# Patient Record
Sex: FEMALE | Race: BLACK OR AFRICAN AMERICAN | Employment: UNEMPLOYED | URBAN - METROPOLITAN AREA
[De-identification: names, ages, dates, MRNs, and addresses within clinical notes are randomized per-mention and may not be internally consistent; named-entity substitution may affect disease eponyms.]

---

## 2018-09-10 ENCOUNTER — APPOINTMENT (OUTPATIENT)
Dept: GENERAL RADIOLOGY | Age: 58
End: 2018-09-10
Payer: COMMERCIAL

## 2018-09-10 ENCOUNTER — HOSPITAL ENCOUNTER (EMERGENCY)
Age: 58
Discharge: HOME | End: 2018-09-10
Attending: EMERGENCY MEDICINE
Payer: COMMERCIAL

## 2018-09-10 VITALS
SYSTOLIC BLOOD PRESSURE: 171 MMHG | WEIGHT: 190 LBS | TEMPERATURE: 98.1 F | OXYGEN SATURATION: 100 % | HEART RATE: 79 BPM | HEIGHT: 63 IN | BODY MASS INDEX: 33.66 KG/M2 | RESPIRATION RATE: 16 BRPM | DIASTOLIC BLOOD PRESSURE: 96 MMHG

## 2018-09-10 DIAGNOSIS — G89.29 ACUTE EXACERBATION OF CHRONIC LOW BACK PAIN: Primary | ICD-10-CM

## 2018-09-10 DIAGNOSIS — S93.601A FOOT SPRAIN, RIGHT, INITIAL ENCOUNTER: ICD-10-CM

## 2018-09-10 DIAGNOSIS — R03.0 ELEVATED BLOOD PRESSURE READING: ICD-10-CM

## 2018-09-10 DIAGNOSIS — S80.01XA CONTUSION OF RIGHT KNEE, INITIAL ENCOUNTER: ICD-10-CM

## 2018-09-10 DIAGNOSIS — M54.50 ACUTE EXACERBATION OF CHRONIC LOW BACK PAIN: Primary | ICD-10-CM

## 2018-09-10 PROCEDURE — 73630 X-RAY EXAM OF FOOT: CPT

## 2018-09-10 PROCEDURE — 99284 EMERGENCY DEPT VISIT MOD MDM: CPT

## 2018-09-10 RX ORDER — DOCUSATE SODIUM 100 MG/1
100 CAPSULE, LIQUID FILLED ORAL 2 TIMES DAILY
COMMUNITY

## 2018-09-10 RX ORDER — METHOCARBAMOL 750 MG/1
750 TABLET, FILM COATED ORAL 3 TIMES DAILY PRN
Qty: 30 TABLET | Refills: 0 | Status: SHIPPED | OUTPATIENT
Start: 2018-09-10 | End: 2018-09-20

## 2018-09-10 RX ORDER — TRAMADOL HYDROCHLORIDE 50 MG/1
50 TABLET ORAL EVERY 6 HOURS PRN
Qty: 12 TABLET | Refills: 0 | Status: SHIPPED | OUTPATIENT
Start: 2018-09-10 | End: 2018-09-13

## 2018-09-10 RX ORDER — AMLODIPINE BESYLATE 10 MG/1
10 TABLET ORAL DAILY
COMMUNITY
End: 2020-09-14 | Stop reason: SDUPTHER

## 2018-09-10 ASSESSMENT — PAIN SCALES - GENERAL: PAINLEVEL_OUTOF10: 10

## 2018-09-10 ASSESSMENT — PAIN DESCRIPTION - INTENSITY: RATING_2: 8

## 2018-09-10 ASSESSMENT — PAIN DESCRIPTION - LOCATION
LOCATION: BACK
LOCATION_2: ANKLE

## 2018-09-10 ASSESSMENT — PAIN DESCRIPTION - PAIN TYPE: TYPE: ACUTE PAIN

## 2018-09-10 ASSESSMENT — PAIN DESCRIPTION - ORIENTATION: ORIENTATION_2: RIGHT

## 2018-09-10 NOTE — ED NOTES
Reviewed d/c instructions with pt. Patient discharged home with scripts  X2 .      Kriss Mccord RN  09/10/18 2509

## 2018-09-10 NOTE — ED PROVIDER NOTES
TRIAGE CHIEF COMPLAINT:   Chief Complaint   Patient presents with    Fall         HPI: Joel Dowell is a 62 y.o. female who presents to the Emergency Department with complaint of Mid back pain, right knee pain and right foot pain. The patient fell last night landing on her hands and knees. Denies loss of consciousness. She thinks she may have hit her head but denies headache. She has no neck pain. Denies numbness or weakness. She has no chest pain or shortness of breath. Denies abdominal pain. She states Tylenol has not been helping. OARRS reviewed. Narcotics score was 110. REVIEW OF SYSTEMS:  6 systems reviewed. Pertinent positives per HPI. Otherwise noted to be negative. Nursing notes reviewed and agree with above. Past medical/surgical history reviewed. ALLERGIES   Allergies   Allergen Reactions    Aspirin      Pt does not know     Pcn [Penicillins]          BP (!) 171/96   Pulse 79   Temp 98.1 °F (36.7 °C) (Oral)   Resp 16   Ht 5' 3\" (1.6 m)   Wt 86.2 kg (190 lb)   SpO2 100%   BMI 33.66 kg/m²   General:  No acute distress. Non toxic appearance  Head:   Normocephalic and atraumatic  Eyes:   Conjunctiva clear, EOM's intact. Sclera anicteric. ENT:   Mucous membranes moist  Neck:   Supple. No adenopathy or jugular venous distension  Lungs/Chest:  No respiratory distress  CVS:   Regular rate and rhythm  Abdomen: Bowel sounds normal.  Abdomen soft and nontender. Extremities:  Mild inconsistent tenderness of the right anterior knee. No gross laxity or effusion. Range of motion is normal.  No hip or ankle tenderness. There is some mild tenderness of the lateral aspect of the right foot. No point tenderness. No deformity noted. Distal neurovascular exam is intact. Skin:   No rashes or lesions to exposed skin  Back:   Patient has some tenderness in the right mid and right upper back. She states the upper back tenderness is chronic.   Mild tenderness in the right paralumbar

## 2019-06-05 ENCOUNTER — HOSPITAL ENCOUNTER (EMERGENCY)
Age: 59
Discharge: HOME | End: 2019-06-05
Attending: EMERGENCY MEDICINE
Payer: COMMERCIAL

## 2019-06-05 ENCOUNTER — APPOINTMENT (OUTPATIENT)
Dept: GENERAL RADIOLOGY | Age: 59
End: 2019-06-05
Payer: COMMERCIAL

## 2019-06-05 VITALS
RESPIRATION RATE: 17 BRPM | SYSTOLIC BLOOD PRESSURE: 159 MMHG | WEIGHT: 181.8 LBS | OXYGEN SATURATION: 98 % | HEART RATE: 85 BPM | HEIGHT: 63 IN | DIASTOLIC BLOOD PRESSURE: 95 MMHG | TEMPERATURE: 98.4 F | BODY MASS INDEX: 32.21 KG/M2
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
## 2019-06-05 DIAGNOSIS — S80.01XA CONTUSION OF RIGHT KNEE, INITIAL ENCOUNTER: ICD-10-CM

## 2019-06-05 DIAGNOSIS — S20.211A CONTUSION OF RIGHT CHEST WALL, INITIAL ENCOUNTER: Primary | ICD-10-CM

## 2019-06-05 DIAGNOSIS — R03.0 ELEVATED BLOOD PRESSURE READING: ICD-10-CM

## 2019-06-05 LAB
EKG ATRIAL RATE: 70 BPM
EKG DIAGNOSIS: NORMAL
EKG P AXIS: 67 DEGREES
EKG P-R INTERVAL: 170 MS
EKG Q-T INTERVAL: 400 MS
EKG QRS DURATION: 80 MS
EKG QTC CALCULATION (BAZETT): 432 MS
EKG R AXIS: 24 DEGREES
EKG T AXIS: 42 DEGREES
EKG VENTRICULAR RATE: 70 BPM

## 2019-06-05 PROCEDURE — 93010 ELECTROCARDIOGRAM REPORT: CPT | Performed by: INTERNAL MEDICINE

## 2019-06-05 PROCEDURE — 99283 EMERGENCY DEPT VISIT LOW MDM: CPT

## 2019-06-05 PROCEDURE — 71101 X-RAY EXAM UNILAT RIBS/CHEST: CPT

## 2019-06-05 PROCEDURE — 73562 X-RAY EXAM OF KNEE 3: CPT

## 2019-06-05 PROCEDURE — 93005 ELECTROCARDIOGRAM TRACING: CPT | Performed by: EMERGENCY MEDICINE

## 2019-06-05 RX ORDER — METHOCARBAMOL 750 MG/1
750 TABLET, FILM COATED ORAL 3 TIMES DAILY PRN
Qty: 30 TABLET | Refills: 0 | Status: SHIPPED | OUTPATIENT
Start: 2019-06-05 | End: 2019-06-15

## 2019-06-05 RX ORDER — TRAMADOL HYDROCHLORIDE 50 MG/1
50 TABLET ORAL EVERY 6 HOURS PRN
Qty: 12 TABLET | Refills: 0 | Status: SHIPPED | OUTPATIENT
Start: 2019-06-05 | End: 2019-06-08

## 2019-06-05 ASSESSMENT — PAIN DESCRIPTION - DESCRIPTORS: DESCRIPTORS: THROBBING

## 2019-06-05 ASSESSMENT — PAIN DESCRIPTION - LOCATION: LOCATION: OTHER (COMMENT)

## 2019-06-05 ASSESSMENT — PAIN SCALES - GENERAL: PAINLEVEL_OUTOF10: 10

## 2019-06-05 ASSESSMENT — PAIN DESCRIPTION - FREQUENCY: FREQUENCY: CONTINUOUS

## 2019-06-05 ASSESSMENT — PAIN DESCRIPTION - PAIN TYPE: TYPE: ACUTE PAIN

## 2019-06-05 NOTE — ED PROVIDER NOTES
TRIAGE CHIEF COMPLAINT:   Chief Complaint   Patient presents with    Fall         HPI: Karmen Alerge is a 62 y.o. female who presents to the Emergency Department with complaint of Right mid chest and right mid back pain as well as right knee pain. The patient fell yesterday. No loss of consciousness. She thinks she landed on her right knee and right arm. She does not remember hitting her chest.  Denies head injury or headache. She has no neck pain. The chest pain worsens with truncal movement and breathing. It worsens with coughing but she does not feel she has a chest cold. Her pain does not worsen with normal ambulation. No nausea or vomiting. Denies shortness of breath. She has no diaphoresis. She does have a history of chronic lower back pain. OARRS rreviewed. REVIEW OF SYSTEMS:  6 systems reviewed. Pertinent positives per HPI. Otherwise noted to be negative. Nursing notes reviewed and agree with above. Past medical/surgical history reviewed.     MEDICATIONS   Patient's Medications   New Prescriptions    No medications on file   Previous Medications    ACETAMINOPHEN (TYLENOL PO)    Take by mouth    AMLODIPINE (NORVASC) 10 MG TABLET    Take 10 mg by mouth daily    DOCUSATE SODIUM (COLACE) 100 MG CAPSULE    Take 100 mg by mouth 2 times daily    HYDROCHLOROTHIAZIDE (HYDRODIURIL) 25 MG TABLET    Take 1 tablet by mouth daily    IBUPROFEN (IBU) 600 MG TABLET    Take 1 tablet by mouth every 8 hours as needed for Pain    MULTIPLE VITAMINS-MINERALS (THERAPEUTIC MULTIVITAMIN-MINERALS) TABLET    Take 1 tablet by mouth daily   Modified Medications    No medications on file   Discontinued Medications    No medications on file         ALLERGIES   Allergies   Allergen Reactions    Aspirin      Pt does not know     Pcn [Penicillins]          BP (!) 159/95   Pulse 85   Temp 98.4 °F (36.9 °C) (Oral)   Resp 17   Ht 5' 3\" (1.6 m)   Wt 82.5 kg (181 lb 12.8 oz)   SpO2 98%   BMI 32.20 kg/m² General:  No acute distress. Non toxic appearance  Head:   Normocephalic and atraumatic  Eyes:   Conjunctiva clear, MCKAYLA, EOM's intact. Sclera anicteric. ENT:   Mucous membranes moist  Neck:   Supple. No adenopathy or jugular venous distension. Neck is nontender. Lungs/Chest:  No respiratory distress. Lungs are clear bilaterally. There is some tenderness in the right parasternal area which seems to mimic the patient's pain. CVS:   Regular rate and rhythm  Abdomen:  nnontender  Extremities:  Mild tenderness of the right knee. No deformity or definite point tenderness. No obvious effusion or laxity. Range of motion is mildly limited due to pain. Right arm is nontender and has normal range of motion. Skin:   No rashes or lesions to exposed skin  Back:   There is some right posterolateral rib tenderness. No visible bruising or crepitance. No left-sided tenderness. No CVA tenderness. Neuro:  Alert and OX3. Speech clear and appropriate. No focal weakness. Normal sensation in all extremities. Gait normal.  Psych:   Affect normal. Mood normal        RADIOLOGY:  XR RIBS RIGHT INCLUDE CHEST (MIN 3 VIEWS)   Final Result      No rib fracture      XR KNEE RIGHT (3 VIEWS)   Final Result      No acute bony pathology. Mild degenerative changes          EKG  Sinus rhythm at a rate of 70. Axis is 24. There is no ischemia. No ectopy noted. QTC is 432. ED COURSE / MDM:  40-year-old female with history of chronic lower back pain fell yesterday. No loss of consciousness, head injury or headache. Today she complains of some right mid chest pain and right mid back pain. She also has some pain in the right knee. Lung sounds are clear. She is neurologically intact. No neck pain or tenderness. Head is normocephalic and atraumatic. EKG shows no ischemia or arrhythmia. X-rays of the right ribs/chest and right knee read by the radiologist show no fracture  Or acute bony abnormality.   I recommended the patient use ice and rest the area. Clinically this is a contusion. She states the pain was minimal yesterday. She was given a few tramadol tablets and Robaxin to take for pain or muscle tightness. I discussed with Luis Fernando Batista the results of evaluation in the Emergency Department, diagnosis, care and prognosis. The plan is to discharge to home. The patient is in agreement with the plan and questions have been answered. I also discussed with the patient and/or family the reasons which may require a return visit and the importance of follow-up care.        (Please note that portions of this note may have been completed with a voice recognition program.  Efforts were made to edit the dictation but occasionally words are mis-transcribed)        FINAL IMPRESSION:  1 -- chest wall contusion  2 -- right knee contusion  3 -- fall    4 -- elevated blood pressure reading                Jaci Carlson MD  06/05/19 1608 [facility] Liane Gates MD  06/05/19 5960

## 2019-06-05 NOTE — ED NOTES
Patient prepared for and ready to be discharged. Patient discharged at this time in no acute distress after verbalizing understanding of discharge instructions. Patient left after receiving After Visit Summary instructions.         Uvaldo Walton RN  06/05/19 1125

## 2019-09-02 ENCOUNTER — HOSPITAL ENCOUNTER (EMERGENCY)
Age: 59
Discharge: HOME | End: 2019-09-02
Attending: EMERGENCY MEDICINE
Payer: COMMERCIAL

## 2019-09-02 VITALS
DIASTOLIC BLOOD PRESSURE: 88 MMHG | OXYGEN SATURATION: 100 % | WEIGHT: 177.6 LBS | RESPIRATION RATE: 16 BRPM | HEART RATE: 87 BPM | TEMPERATURE: 98.3 F | SYSTOLIC BLOOD PRESSURE: 150 MMHG | BODY MASS INDEX: 31.46 KG/M2

## 2019-09-02 DIAGNOSIS — R05.9 COUGH: ICD-10-CM

## 2019-09-02 DIAGNOSIS — R09.82 POST-NASAL DRIP: Primary | ICD-10-CM

## 2019-09-02 PROCEDURE — 99282 EMERGENCY DEPT VISIT SF MDM: CPT

## 2019-09-02 RX ORDER — CETIRIZINE HYDROCHLORIDE 10 MG/1
10 TABLET ORAL DAILY
Qty: 20 TABLET | Refills: 0 | Status: ON HOLD | OUTPATIENT
Start: 2019-09-02 | End: 2021-01-28 | Stop reason: SINTOL

## 2019-09-02 ASSESSMENT — PAIN SCALES - GENERAL: PAINLEVEL_OUTOF10: 9

## 2019-09-02 ASSESSMENT — PAIN DESCRIPTION - LOCATION: LOCATION: THROAT

## 2019-09-07 NOTE — ED PROVIDER NOTES
used smokeless tobacco. She reports that she has current or past drug history. Drug: Marijuana. She reports that she does not drink alcohol. Family history:  History reviewed. No pertinent family history. REVIEW OF SYSTEMS  6 systems reviewed, pertinent positives per HPI otherwise noted to be negative    PHYSICAL EXAM  Vitals:    09/02/19 1025   BP: (!) 150/88   Pulse: 87   Resp: 16   Temp: 98.3 °F (36.8 °C)   SpO2: 100%       GENERAL: Patient is well-developed, well-nourished,  no acute distress. Mild apparent discomfort. Non toxic appearing. HEENT:  Normocephalic, atraumatic. PERRL. Conjunctiva appear normal.  External ears are normal.  MMM. Posterior oropharynx within normal limits. No erythema, or exudate. Clear nasal discharge. NECK: Supple with normal ROM. Trachea midline  LUNGS:  Normal work of breathing. Speaking comfortably in full sentences. EXTREMITIES: 2+ distal pulses w/o edema. MUSCULOSKELETAL:  Atraumatic extremities with normal ROM grossly. No obvious bony deformities. SKIN: Warm/dry. No rashes/lesions noted. PSYCHIATRIC: Patient is alert and oriented with normal affect  NEUROLOGIC: Cranial nerves grossly intact. Moves all extremities with equal strength. No gross sensory deficits. Answers questions/follows commands appropriately. ED COURSE/MDM  Nursing notes reviewed. Pt was given the following medications or treatments in the ED    None     Clinical Impression  Based on the presenting complaint, history, and physical exam, multiple diagnoses were considered. Exam and workup here most c/w:  1. Post-nasal drip    2. Cough        I discussed with Yennifer Lockhart the results of evaluation in the ED, diagnosis, care, and prognosis. The plan is to discharge to home. Patient is in agreement with plan and questions have been answered. I also discussed with Yennifer Lockhart the reasons which may require a return visit and the importance of follow-up care.   The patient is well-appearing, nontoxic, and improved at the time of discharge. Patient agrees to call to arrange follow-up care as directed. Roxy Crouch understands to return immediately for worsening/change in symptoms. Patient will be started on the following medications from the ED:  Discharge Medication List as of 9/2/2019 11:03 AM      START taking these medications    Details   cetirizine (ZYRTEC ALLERGY) 10 MG tablet Take 1 tablet by mouth daily, Disp-20 tablet, R-0Print               Disposition  Pt is discharged in stable condition.     Disposition Vitals:  BP (!) 150/88   Pulse 87   Temp 98.3 °F (36.8 °C) (Oral)   Resp 16   Wt 177 lb 9.6 oz (80.6 kg)   SpO2 100%   BMI 31.46 kg/m²                    Beverly Brooks DO  09/07/19 8849

## 2020-09-14 ENCOUNTER — APPOINTMENT (OUTPATIENT)
Dept: GENERAL RADIOLOGY | Age: 60
End: 2020-09-14
Payer: COMMERCIAL

## 2020-09-14 ENCOUNTER — HOSPITAL ENCOUNTER (EMERGENCY)
Age: 60
Discharge: HOME | End: 2020-09-14
Attending: EMERGENCY MEDICINE
Payer: COMMERCIAL

## 2020-09-14 VITALS
DIASTOLIC BLOOD PRESSURE: 91 MMHG | HEIGHT: 63 IN | BODY MASS INDEX: 36.62 KG/M2 | OXYGEN SATURATION: 100 % | TEMPERATURE: 98.4 F | SYSTOLIC BLOOD PRESSURE: 179 MMHG | WEIGHT: 206.7 LBS | RESPIRATION RATE: 16 BRPM | HEART RATE: 81 BPM

## 2020-09-14 LAB
A/G RATIO: 1.3 (ref 1.1–2.2)
ALBUMIN SERPL-MCNC: 4.3 G/DL (ref 3.4–5)
ALP BLD-CCNC: 106 U/L (ref 40–129)
ALT SERPL-CCNC: 10 U/L (ref 10–40)
ANION GAP SERPL CALCULATED.3IONS-SCNC: 9 MMOL/L (ref 3–16)
AST SERPL-CCNC: 12 U/L (ref 15–37)
BASOPHILS ABSOLUTE: 0 K/UL (ref 0–0.2)
BASOPHILS RELATIVE PERCENT: 0.5 %
BILIRUB SERPL-MCNC: 0.3 MG/DL (ref 0–1)
BUN BLDV-MCNC: 11 MG/DL (ref 7–20)
CALCIUM SERPL-MCNC: 10.2 MG/DL (ref 8.3–10.6)
CHLORIDE BLD-SCNC: 109 MMOL/L (ref 99–110)
CO2: 25 MMOL/L (ref 21–32)
CREAT SERPL-MCNC: 0.6 MG/DL (ref 0.6–1.1)
EKG ATRIAL RATE: 67 BPM
EKG DIAGNOSIS: NORMAL
EKG P AXIS: 63 DEGREES
EKG P-R INTERVAL: 174 MS
EKG Q-T INTERVAL: 398 MS
EKG QRS DURATION: 76 MS
EKG QTC CALCULATION (BAZETT): 420 MS
EKG R AXIS: 14 DEGREES
EKG T AXIS: 37 DEGREES
EKG VENTRICULAR RATE: 67 BPM
EOSINOPHILS ABSOLUTE: 0.1 K/UL (ref 0–0.6)
EOSINOPHILS RELATIVE PERCENT: 1.4 %
GFR AFRICAN AMERICAN: >60
GFR NON-AFRICAN AMERICAN: >60
GLOBULIN: 3.2 G/DL
GLUCOSE BLD-MCNC: 104 MG/DL (ref 70–99)
HCT VFR BLD CALC: 38.3 % (ref 36–48)
HEMOGLOBIN: 12.7 G/DL (ref 12–16)
LYMPHOCYTES ABSOLUTE: 2.8 K/UL (ref 1–5.1)
LYMPHOCYTES RELATIVE PERCENT: 34.4 %
MAGNESIUM: 1.9 MG/DL (ref 1.8–2.4)
MCH RBC QN AUTO: 31.4 PG (ref 26–34)
MCHC RBC AUTO-ENTMCNC: 33.2 G/DL (ref 31–36)
MCV RBC AUTO: 94.6 FL (ref 80–100)
MONOCYTES ABSOLUTE: 0.6 K/UL (ref 0–1.3)
MONOCYTES RELATIVE PERCENT: 8 %
NEUTROPHILS ABSOLUTE: 4.5 K/UL (ref 1.7–7.7)
NEUTROPHILS RELATIVE PERCENT: 55.7 %
PDW BLD-RTO: 13 % (ref 12.4–15.4)
PLATELET # BLD: 192 K/UL (ref 135–450)
PLATELET SLIDE REVIEW: ADEQUATE
PMV BLD AUTO: 9.1 FL (ref 5–10.5)
POTASSIUM SERPL-SCNC: 3.6 MMOL/L (ref 3.5–5.1)
RBC # BLD: 4.04 M/UL (ref 4–5.2)
SODIUM BLD-SCNC: 143 MMOL/L (ref 136–145)
TOTAL PROTEIN: 7.5 G/DL (ref 6.4–8.2)
WBC # BLD: 8.1 K/UL (ref 4–11)

## 2020-09-14 PROCEDURE — 72072 X-RAY EXAM THORAC SPINE 3VWS: CPT

## 2020-09-14 PROCEDURE — 72100 X-RAY EXAM L-S SPINE 2/3 VWS: CPT

## 2020-09-14 PROCEDURE — 80053 COMPREHEN METABOLIC PANEL: CPT

## 2020-09-14 PROCEDURE — 93010 ELECTROCARDIOGRAM REPORT: CPT | Performed by: INTERNAL MEDICINE

## 2020-09-14 PROCEDURE — 73610 X-RAY EXAM OF ANKLE: CPT

## 2020-09-14 PROCEDURE — 99284 EMERGENCY DEPT VISIT MOD MDM: CPT

## 2020-09-14 PROCEDURE — 83735 ASSAY OF MAGNESIUM: CPT

## 2020-09-14 PROCEDURE — 93005 ELECTROCARDIOGRAM TRACING: CPT | Performed by: EMERGENCY MEDICINE

## 2020-09-14 PROCEDURE — 85025 COMPLETE CBC W/AUTO DIFF WBC: CPT

## 2020-09-14 RX ORDER — AMLODIPINE BESYLATE 5 MG/1
5 TABLET ORAL DAILY
Qty: 30 TABLET | Refills: 0 | Status: SHIPPED | OUTPATIENT
Start: 2020-09-14 | End: 2022-10-05

## 2020-09-14 RX ORDER — HYDROCHLOROTHIAZIDE 25 MG/1
25 TABLET ORAL EVERY MORNING
Qty: 14 TABLET | Refills: 0 | Status: SHIPPED | OUTPATIENT
Start: 2020-09-14 | End: 2022-10-05

## 2020-09-14 RX ORDER — METHOCARBAMOL 750 MG/1
750 TABLET, FILM COATED ORAL 3 TIMES DAILY
Qty: 15 TABLET | Refills: 0 | Status: SHIPPED | OUTPATIENT
Start: 2020-09-14 | End: 2020-09-19

## 2020-09-14 ASSESSMENT — PAIN SCALES - GENERAL: PAINLEVEL_OUTOF10: 10

## 2020-09-14 ASSESSMENT — PAIN DESCRIPTION - PAIN TYPE: TYPE: ACUTE PAIN

## 2020-09-14 ASSESSMENT — PAIN DESCRIPTION - ORIENTATION: ORIENTATION: RIGHT

## 2020-09-14 ASSESSMENT — PAIN DESCRIPTION - LOCATION: LOCATION: ANKLE;BACK

## 2020-09-14 NOTE — ED NOTES
Patient to ed with complaints of a right ankle injury and right back pain after she lost her balance and fell, patient denies illness reports \"her joints give out for a minute\".      Joey Barnett RN  09/14/20 7302

## 2020-09-14 NOTE — ED NOTES
Patient given prescription,  discharge instructions verbal and written, patient verbalized understanding. Alert/oriented X4, Clear speech.   Patient exhibits no distress, ambulates with steady gait per self leaving unit, no further request.     David Miguel RN  09/14/20 6048

## 2020-09-14 NOTE — ED PROVIDER NOTES
[facility]  EMERGENCY DEPT VISIT      Patient Identification  Joseph Nava is a 61 y.o. female. Chief Complaint   Ankle Pain (right)      History of Present Illness: This is a  61 y.o. female who presents ambulatory  to the ED with complaints of recurrent falls. Patient states that for several months now she has been falling very frequently. She does not know what causes her fall. She denies any dizziness or syncope. She states that she can fall up to 3-4 times per day. She thinks that her joints just give out on her. She states that she noticed her right ankle was swollen so started taking over-the-counter water pills for the last 2 weeks. Most of the time when she fell she does not injure herself. She denies hitting her head at any point. This morning she fell in the shower and hit her back against the vanity and does complain of some right sided mid to low back pain. Patient states that she has not been taking her normal medications for at least 6 months because she did not think that she could go to see the doctor with coronavirus pandemic. Past Medical History:   Diagnosis Date    Chronic pain     Hypertension        Past Surgical History:   Procedure Laterality Date    APPENDECTOMY      CHOLECYSTECTOMY      HYSTERECTOMY         No current facility-administered medications for this encounter.      Current Outpatient Medications:     methocarbamol (ROBAXIN-750) 750 MG tablet, Take 1 tablet by mouth 3 times daily for 5 days, Disp: 15 tablet, Rfl: 0    amLODIPine (NORVASC) 5 MG tablet, Take 1 tablet by mouth daily, Disp: 30 tablet, Rfl: 0    hydroCHLOROthiazide (HYDRODIURIL) 25 MG tablet, Take 1 tablet by mouth every morning, Disp: 14 tablet, Rfl: 0    ROSUVASTATIN CALCIUM PO, Take by mouth, Disp: , Rfl:     SPIRONOLACTONE PO, Take by mouth, Disp: , Rfl:     CHLORTHALIDONE PO, Take by mouth, Disp: , Rfl:     cetirizine (ZYRTEC ALLERGY) 10 MG tablet, Take 1 tablet by mouth daily, Disp: 20 tablet, Rfl: 0    Acetaminophen (TYLENOL PO), Take by mouth, Disp: , Rfl:     docusate sodium (COLACE) 100 MG capsule, Take 100 mg by mouth 2 times daily, Disp: , Rfl:     ibuprofen (IBU) 600 MG tablet, Take 1 tablet by mouth every 8 hours as needed for Pain, Disp: 20 tablet, Rfl: 0    Multiple Vitamins-Minerals (THERAPEUTIC MULTIVITAMIN-MINERALS) tablet, Take 1 tablet by mouth daily, Disp: , Rfl:     hydrochlorothiazide (HYDRODIURIL) 25 MG tablet, Take 1 tablet by mouth daily, Disp: 30 tablet, Rfl: 2    Allergies   Allergen Reactions    Aspirin      Pt does not know     Pcn [Penicillins]        Social History     Socioeconomic History    Marital status: Legally      Spouse name: Not on file    Number of children: Not on file    Years of education: Not on file    Highest education level: Not on file   Occupational History    Not on file   Social Needs    Financial resource strain: Not on file    Food insecurity     Worry: Not on file     Inability: Not on file    Transportation needs     Medical: Not on file     Non-medical: Not on file   Tobacco Use    Smoking status: Current Some Day Smoker    Smokeless tobacco: Never Used   Substance and Sexual Activity    Alcohol use: No     Comment: occassionally    Drug use: Yes     Types: Marijuana    Sexual activity: Not on file   Lifestyle    Physical activity     Days per week: Not on file     Minutes per session: Not on file    Stress: Not on file   Relationships    Social connections     Talks on phone: Not on file     Gets together: Not on file     Attends Cheondo[employer] service: Not on file     Active member of club or organization: Not on file     Attends meetings of clubs or organizations: Not on file     Relationship status: Not on file    Intimate partner violence     Fear of current or ex partner: Not on file     Emotionally abused: Not on file     Physically abused: Not on file     Forced sexual activity: Not on file   Other Topics Concern    Not on file   Social History Narrative    Not on file       Nursing Notes Reviewed      ROS:  GENERAL:  No fever, no chills, no diaphoresis, no appetite changes  EYES: no eye discharge, no eye redness, no visual changes  ENT: no nasal congestion, no sore throat  CARDIAC: no chest pain, no palpitations, + leg swelling  PULM: no cough, no shortness of breath  ABD: no abdominal pain, no nausea, no vomiting, no diarrhea, no melena or hematochezia  : no dysuria, no hematuria, no urgency, no frequency. No flank pain  MUSCULOSKELETAL: + back pain, + arthralgias, no myalgias  NEURO: no headache, no lightheadedness, no dizziness, no numbness, no weakness, no syncope, no confusion, no speech difficulty  SKIN: no rashes, no erythema, no wounds, no ecchymosis      PHYSICAL EXAM:  GENERAL APPEARANCE: Jasen Crowe is in no acute respiratory distress. Awake and alert. VITAL SIGNS:   ED Triage Vitals   Enc Vitals Group      BP 09/14/20 1200 (!) 179/91      Pulse 09/14/20 1156 81      Resp 09/14/20 1156 18      Temp 09/14/20 1156 98.4 °F (36.9 °C)      Temp src --       SpO2 09/14/20 1156 100 %      Weight 09/14/20 1156 206 lb 11.2 oz (93.8 kg)      Height 09/14/20 1156 5' 3\" (1.6 m)      Head Circumference --       Peak Flow --       Pain Score --       Pain Loc --       Pain Edu? --       Excl. in GC? --      HEAD: Normocephalic, atraumatic. EYES:  Extraocular muscles are intact. Pupils equal round and reactive to light. Conjunctivas are pink. Negative scleral icterus. ENT:  Mucous membranes are moist.  Pharynx without erythema or exudates. NECK: Nontender and supple. No cervical adenopathy. CHEST:  Clear to auscultation bilaterally. No rales, rhonchi, or wheezing. HEART:  Regular rate and regular rhythm. No murmurs. Strong and equal pulses in the upper and lower extremities. ABDOMEN: Soft,  nondistended, positive bowel sounds. abdomen is nontender. No rebound. no guarding.   MUSCULOSKELETAL: The calves are nontender to palpation. Active range of motion of the upper and lower extremities. 1-2plus bilateral edema. Right ankle diffusely tender. Tenderness to right paravertbral muscles along the low thoracic and lumbar spine  NEUROLOGICAL: Awake, alert and oriented x 3. Power intact in the upper and lower extremities. Sensation is intact to light touch in the upper and lower extremities. Cranial Nerves 2-12 are intact. No truncal ataxia. No dysarthria or aphasia. Normal finger to nose. NIH Stroke Scale     1a  Level of consciousness: 0=alert; keenly responsive   1b. LOC questions:  0=Performs both tasks correctly   1c. LOC commands: 0=Performs both tasks correctly   2. Best Gaze: 0=normal   3. Visual: 0=No visual loss   4. Facial Palsy: 0=Normal symmetric movement   5a. Motor left arm: 0=No drift, limb holds 90 (or 45) degrees for full 10 seconds   5b. Motor right arm: 0=No drift, limb holds 90 (or 45) degrees for full 10 seconds   6a. motor left le=No drift, limb holds 90 (or 45) degrees for full 10 seconds   6b  Motor right le=No drift, limb holds 90 (or 45) degrees for full 10 seconds   7. Limb Ataxia: 0=Absent   8. Sensory: 0=Normal; no sensory loss   9. Best Language:  0=No aphasia, normal   10. Dysarthria: 0=Normal   11. Extinction and Inattention: 0=No abnormality         Total:  0       DERMATOLOGIC: No petechiae, rashes, or ecchymoses. No erythema. PSYCH: normal mood and affect. Normal thought content. ED COURSE AND MEDICAL DECISION MAKING:    EKG as interpreted by myself:  normal sinus rhythm with a rate of 67  Axis is   Normal  QTc is  normal  Intervals and Durations are unremarkable. No specific ST-T wave changes appreciated. No evidence of acute ischemia. Compared to prior EKG dated 19, no significant change    Radiology:  Films have been read by radiologist as noted in chart unless otherwise stated.  Other radiologic studies (i.e. CT, MRI, ultrasounds, etc ) have been interpreted by radiologist.     XR THORACIC SPINE (3 VIEWS)   Final Result      1. No evidence of recent injury. 2. Degenerative changes of the spine as above. XR ANKLE RIGHT (MIN 3 VIEWS)   Final Result      1. No evidence of fracture. XR LUMBAR SPINE (2-3 VIEWS)   Final Result      1. Degenerative change spine as above.           Labs:  Results for orders placed or performed during the hospital encounter of 09/14/20   CBC Auto Differential   Result Value Ref Range    WBC 8.1 4.0 - 11.0 K/uL    RBC 4.04 4.00 - 5.20 M/uL    Hemoglobin 12.7 12.0 - 16.0 g/dL    Hematocrit 38.3 36.0 - 48.0 %    MCV 94.6 80.0 - 100.0 fL    MCH 31.4 26.0 - 34.0 pg    MCHC 33.2 31.0 - 36.0 g/dL    RDW 13.0 12.4 - 15.4 %    Platelets 254 808 - 676 K/uL    MPV 9.1 5.0 - 10.5 fL    PLATELET SLIDE REVIEW Adequate     Neutrophils % 55.7 %    Lymphocytes % 34.4 %    Monocytes % 8.0 %    Eosinophils % 1.4 %    Basophils % 0.5 %    Neutrophils Absolute 4.5 1.7 - 7.7 K/uL    Lymphocytes Absolute 2.8 1.0 - 5.1 K/uL    Monocytes Absolute 0.6 0.0 - 1.3 K/uL    Eosinophils Absolute 0.1 0.0 - 0.6 K/uL    Basophils Absolute 0.0 0.0 - 0.2 K/uL   Comprehensive Metabolic Panel   Result Value Ref Range    Sodium 143 136 - 145 mmol/L    Potassium 3.6 3.5 - 5.1 mmol/L    Chloride 109 99 - 110 mmol/L    CO2 25 21 - 32 mmol/L    Anion Gap 9 3 - 16    Glucose 104 (H) 70 - 99 mg/dL    BUN 11 7 - 20 mg/dL    CREATININE 0.6 0.6 - 1.1 mg/dL    GFR Non-African American >60 >60    GFR African American >60 >60    Calcium 10.2 8.3 - 10.6 mg/dL    Total Protein 7.5 6.4 - 8.2 g/dL    Alb 4.3 3.4 - 5.0 g/dL    Albumin/Globulin Ratio 1.3 1.1 - 2.2    Total Bilirubin 0.3 0.0 - 1.0 mg/dL    Alkaline Phosphatase 106 40 - 129 U/L    ALT 10 10 - 40 U/L    AST 12 (L) 15 - 37 U/L    Globulin 3.2 g/dL   Magnesium   Result Value Ref Range    Magnesium 1.90 1.80 - 2.40 mg/dL   EKG 12 Lead   Result Value Ref Range    Ventricular Rate 67 BPM    Atrial Rate 67 BPM    P-R Interval 174 ms    QRS Duration 76 ms    Q-T Interval 398 ms    QTc Calculation (Bazett) 420 ms    P Axis 63 degrees    R Axis 14 degrees    T Axis 37 degrees    Diagnosis       Normal sinus rhythmNormal ECGConfirmed by Suzanne Saeed ([id number]) on 9/14/2020 2:37:55 PM       Treatment in the department:  Patient received the following while in the ED. Medications - No data to display    She was not orthostatic    Repeat exam shows patient ambulatory with limp on right leg    Medical decision making:  Patient presents emergency department with back pain and right ankle pain after multiple falls. Patient is unaware of why she falls but states that she does not become lightheaded or dizzy and has not been syncopal with them. She denies ever hitting her head or having loss of consciousness. She denies concurrent chest pain or palpitations. She has a normal neurologic exam and denies neurologic deficit at the time of the falls. She is not ataxic at this time although walks with a limp but she is complaining of right ankle pain. X-rays do not show any fractures of the back or ankle. She was not orthostatic. She has a normal sinus rhythm. She does not appear dehydrated on labs or particularly anemic. It is unclear why she keeps falling but I recommended the use of a walker. Her most recent injury resulted in pain to her back after falling against a vanity however placed on muscle relaxants and an Aircast provided. Encouraged to follow-up with her primary care physician. She has been off of her medications for several months. She was started back on her Norvasc and water pill. I estimate there is LOW risk for ACUTE CORONARY SYNDROME, INTRACRANIAL HEMORRHAGE, MALIGNANT DYSRHYTHMIA,  PULMONARY EMBOLISM, SEPSIS, SUBARACHNOID HEMORRHAGE, SUBDURAL HEMATOMA, SEVERE ANEMIA OR BLOOD LOSS, or STROKE, SYNCOPE, FRACTURE, thus I consider the discharge disposition reasonable.  Vanessa Shows and I have discussed the diagnosis and risks, and we agree with discharging home to follow-up with their primary doctor. We also discussed returning to the Emergency Department immediately if new or worsening symptoms occur. Clinical Impression:  1. Recurrent falls while walking    2. Acute right-sided low back pain without sciatica    3. Bilateral edema of lower extremity    4. Mild ankle sprain, right, initial encounter    5. Elevated blood pressure reading with diagnosis of hypertension        Dispo:  Patient will be discharged  at this time. Patient was informed of this decision and agrees with plan. I have discussed lab and xray findings with patient and they understand. Questions were answered to the best of my ability. Discharge vitals:  Blood pressure (!) 179/91, pulse 81, temperature 98.4 °F (36.9 °C), resp. rate 16, height 5' 3\" (1.6 m), weight 206 lb 11.2 oz (93.8 kg), SpO2 100 %, not currently breastfeeding. Prescriptions given:   Discharge Medication List as of 9/14/2020  3:08 PM      START taking these medications    Details   methocarbamol (ROBAXIN-750) 750 MG tablet Take 1 tablet by mouth 3 times daily for 5 days, Disp-15 tablet,R-0Print      !! hydroCHLOROthiazide (HYDRODIURIL) 25 MG tablet Take 1 tablet by mouth every morning, Disp-14 tablet,R-0Print       !! - Potential duplicate medications found. Please discuss with provider. This chart was created using Dragon voice recognition software.        Silvia Riggs MD  09/14/20 1659

## 2020-10-13 ENCOUNTER — APPOINTMENT (OUTPATIENT)
Dept: GENERAL RADIOLOGY | Age: 60
End: 2020-10-13
Payer: COMMERCIAL

## 2020-10-13 ENCOUNTER — HOSPITAL ENCOUNTER (EMERGENCY)
Age: 60
Discharge: HOME | End: 2020-10-13
Attending: EMERGENCY MEDICINE
Payer: COMMERCIAL

## 2020-10-13 VITALS
TEMPERATURE: 98.9 F | OXYGEN SATURATION: 97 % | DIASTOLIC BLOOD PRESSURE: 96 MMHG | BODY MASS INDEX: 36.8 KG/M2 | HEART RATE: 95 BPM | RESPIRATION RATE: 20 BRPM | SYSTOLIC BLOOD PRESSURE: 173 MMHG | HEIGHT: 62 IN | WEIGHT: 200 LBS

## 2020-10-13 PROCEDURE — 99283 EMERGENCY DEPT VISIT LOW MDM: CPT

## 2020-10-13 PROCEDURE — 73610 X-RAY EXAM OF ANKLE: CPT

## 2020-10-13 ASSESSMENT — PAIN SCALES - GENERAL: PAINLEVEL_OUTOF10: 9

## 2020-10-13 ASSESSMENT — PAIN DESCRIPTION - FREQUENCY: FREQUENCY: CONTINUOUS

## 2020-10-13 ASSESSMENT — PAIN DESCRIPTION - LOCATION: LOCATION: ANKLE

## 2020-10-13 ASSESSMENT — PAIN DESCRIPTION - ORIENTATION: ORIENTATION: LEFT

## 2020-10-13 ASSESSMENT — PAIN DESCRIPTION - PAIN TYPE: TYPE: ACUTE PAIN

## 2020-10-13 NOTE — ED NOTES
Patient verbalized understanding of d/c instructions. Patient given scripts x 0. Patient left the ED and instructed on follow up.         Pratik Dela Cruz RN  10/13/20 2397

## 2020-10-13 NOTE — ED PROVIDER NOTES
TRIAGE CHIEF COMPLAINT:   Chief Complaint   Patient presents with    Ankle Pain         HPI: Zuly Munguia is a 61 y.o. female who presents to the Emergency Department with complaint of left ankle pain. States she slipped while going up a hill twisting her ankle. She is unsure of the mechanism. Complains of pain lateral greater than medial pain. Denies foot pain, calf or knee pain. No numbness or weakness. REVIEW OF SYSTEMS:  6 systems reviewed. Pertinent positives per HPI. Otherwise noted to be negative. Nursing notes reviewed and agree with above. Past medical/surgical history reviewed. MEDICATIONS   Patient's Medications   New Prescriptions    No medications on file   Previous Medications    ACETAMINOPHEN (TYLENOL PO)    Take by mouth    AMLODIPINE (NORVASC) 5 MG TABLET    Take 1 tablet by mouth daily    CETIRIZINE (ZYRTEC ALLERGY) 10 MG TABLET    Take 1 tablet by mouth daily    CHLORTHALIDONE PO    Take by mouth    DOCUSATE SODIUM (COLACE) 100 MG CAPSULE    Take 100 mg by mouth 2 times daily    HYDROCHLOROTHIAZIDE (HYDRODIURIL) 25 MG TABLET    Take 1 tablet by mouth daily    HYDROCHLOROTHIAZIDE (HYDRODIURIL) 25 MG TABLET    Take 1 tablet by mouth every morning    IBUPROFEN (IBU) 600 MG TABLET    Take 1 tablet by mouth every 8 hours as needed for Pain    MULTIPLE VITAMINS-MINERALS (THERAPEUTIC MULTIVITAMIN-MINERALS) TABLET    Take 1 tablet by mouth daily    ROSUVASTATIN CALCIUM PO    Take by mouth    SPIRONOLACTONE PO    Take by mouth   Modified Medications    No medications on file   Discontinued Medications    No medications on file         ALLERGIES   Allergies   Allergen Reactions    Aspirin      Pt does not know     Pcn [Penicillins]          BP (!) 173/96   Pulse 95   Temp 98.9 °F (37.2 °C) (Oral)   Resp 20   Ht 5' 2\" (1.575 m)   Wt 200 lb (90.7 kg)   SpO2 97%   BMI 36.58 kg/m²   General:  No acute distress.  Non toxic appearance  Head:   Normocephalic and atraumatic  Eyes:   Conjunctiva clear, MCKAYLA, EOM's intact. ENT:   Mucous membranes moist  Neck:   Supple. No adenopathy. Lungs/Chest:  No respiratory distress  CVS:   Regular rate and rhythm  Extremities:  Examination of the left lower extremity shows no deformity or bruising. Mild tenderness on the lateral aspect of the left ankle but no definite point tenderness. Mild decreased range of motion secondary to pain. Foot is nontender. Distal neurovascular exam is intact. There is no calf or knee tenderness. Skin:   No rashes or lesions to exposed skin  Neuro:  Alert and OX3. Speech clear and appropriate. No extremity weakness. Normal sensation in all extremities. No facial asymmetry. Gait normal.  Psych:   Affect normal. Mood normal        RADIOLOGY  XR ANKLE LEFT (MIN 3 VIEWS)   Final Result      No fracture          LAB      ED COURSE / MDM:  59-year-old female with twisting injury to the left ankle earlier today when she slipped while going up a hill. She has some very mild lateral tenderness but no deformity. Distal neurovascular exam is intact. No significant swelling present. X-rays of the left ankle read by the radiologist and reviewed by myself shows no fracture or acute bony abnormality. Patient was given an Aircast.  I recommended rest ice and elevation. Advised Tylenol or ibuprofen if needed for pain. She does have a walker at home and I encouraged her to use this      I discussed with Ricardo Herron the results of evaluation in the Emergency Department, diagnosis, care and prognosis. The plan is to discharge to home. The patient is in agreement with the plan and questions have been answered. I also discussed with the patient and/or family the reasons which may require a return visit and the importance of follow-up care.        (Please note that portions of this note may have been completed with a voice recognition program.  Efforts were made to edit the dictation but occasionally words are mis-transcribed)        FINAL IMPRESSION:  1 --left ankle sprain  2 --elevated blood pressure reading      Mine Ignacio MD  10/13/20 [id number] [place] May Lo MD  10/13/20 0556

## 2020-10-13 NOTE — ED NOTES
Placed pts. Left ankle in a air cast. CMS intact pre and post wrapping. Pt. Tolerated well.       Jennifer Pearson, [facility]  10/13/20 5449

## 2021-01-27 ENCOUNTER — APPOINTMENT (OUTPATIENT)
Dept: GENERAL RADIOLOGY | Age: 61
DRG: 247 | End: 2021-01-27
Payer: COMMERCIAL

## 2021-01-27 ENCOUNTER — APPOINTMENT (OUTPATIENT)
Dept: CT IMAGING | Age: 61
DRG: 247 | End: 2021-01-27
Payer: COMMERCIAL

## 2021-01-27 ENCOUNTER — HOSPITAL ENCOUNTER (INPATIENT)
Age: 61
LOS: 3 days | Discharge: HOME | DRG: 247 | End: 2021-01-31
Attending: EMERGENCY MEDICINE | Admitting: SURGERY
Payer: COMMERCIAL

## 2021-01-27 DIAGNOSIS — K56.609 SMALL BOWEL OBSTRUCTION (HCC): Primary | ICD-10-CM

## 2021-01-27 LAB
ALBUMIN SERPL-MCNC: 4.3 G/DL (ref 3.4–5)
ALP BLD-CCNC: 99 U/L (ref 40–129)
ALT SERPL-CCNC: 17 U/L (ref 10–40)
ANION GAP SERPL CALCULATED.3IONS-SCNC: 14 MMOL/L (ref 3–16)
AST SERPL-CCNC: 16 U/L (ref 15–37)
BACTERIA: ABNORMAL /HPF
BASOPHILS ABSOLUTE: 0.1 K/UL (ref 0–0.2)
BASOPHILS RELATIVE PERCENT: 0.6 %
BILIRUB SERPL-MCNC: 0.4 MG/DL (ref 0–1)
BILIRUBIN DIRECT: <0.2 MG/DL (ref 0–0.3)
BILIRUBIN URINE: ABNORMAL
BILIRUBIN, INDIRECT: NORMAL MG/DL (ref 0–1)
BLOOD, URINE: ABNORMAL
BUN BLDV-MCNC: 11 MG/DL (ref 7–20)
CALCIUM SERPL-MCNC: 10.5 MG/DL (ref 8.3–10.6)
CHLORIDE BLD-SCNC: 97 MMOL/L (ref 99–110)
CLARITY: ABNORMAL
CO2: 25 MMOL/L (ref 21–32)
COLOR: YELLOW
CREAT SERPL-MCNC: 0.8 MG/DL (ref 0.6–1.2)
EOSINOPHILS ABSOLUTE: 0.1 K/UL (ref 0–0.6)
EOSINOPHILS RELATIVE PERCENT: 1 %
EPITHELIAL CELLS, UA: ABNORMAL /HPF (ref 0–5)
GFR AFRICAN AMERICAN: >60
GFR NON-AFRICAN AMERICAN: >60
GLUCOSE BLD-MCNC: 106 MG/DL (ref 70–99)
GLUCOSE URINE: NEGATIVE MG/DL
HCT VFR BLD CALC: 41.5 % (ref 36–48)
HEMOGLOBIN: 14 G/DL (ref 12–16)
HYALINE CASTS: >40 /LPF (ref 0–2)
KETONES, URINE: 15 MG/DL
LEUKOCYTE ESTERASE, URINE: NEGATIVE
LIPASE: 9 U/L (ref 13–60)
LYMPHOCYTES ABSOLUTE: 3.2 K/UL (ref 1–5.1)
LYMPHOCYTES RELATIVE PERCENT: 28.1 %
MAGNESIUM: 1.8 MG/DL (ref 1.8–2.4)
MCH RBC QN AUTO: 31.8 PG (ref 26–34)
MCHC RBC AUTO-ENTMCNC: 33.8 G/DL (ref 31–36)
MCV RBC AUTO: 94.1 FL (ref 80–100)
MICROSCOPIC EXAMINATION: YES
MONOCYTES ABSOLUTE: 1 K/UL (ref 0–1.3)
MONOCYTES RELATIVE PERCENT: 8.5 %
MUCUS: ABNORMAL /LPF
NEUTROPHILS ABSOLUTE: 7 K/UL (ref 1.7–7.7)
NEUTROPHILS RELATIVE PERCENT: 61.8 %
NITRITE, URINE: NEGATIVE
PDW BLD-RTO: 12.8 % (ref 12.4–15.4)
PH UA: 6 (ref 5–8)
PLATELET # BLD: 257 K/UL (ref 135–450)
PMV BLD AUTO: 9.2 FL (ref 5–10.5)
POTASSIUM REFLEX MAGNESIUM: 3.3 MMOL/L (ref 3.5–5.1)
PROTEIN UA: 30 MG/DL
RBC # BLD: 4.41 M/UL (ref 4–5.2)
RBC UA: ABNORMAL /HPF (ref 0–4)
REASON FOR REJECTION: NORMAL
REJECTED TEST: NORMAL
SODIUM BLD-SCNC: 136 MMOL/L (ref 136–145)
SPECIFIC GRAVITY UA: >=1.03 (ref 1–1.03)
TOTAL PROTEIN: 8 G/DL (ref 6.4–8.2)
URINE TYPE: ABNORMAL
UROBILINOGEN, URINE: 0.2 E.U./DL
WBC # BLD: 11.3 K/UL (ref 4–11)
WBC UA: ABNORMAL /HPF (ref 0–5)

## 2021-01-27 PROCEDURE — 80048 BASIC METABOLIC PNL TOTAL CA: CPT

## 2021-01-27 PROCEDURE — 86900 BLOOD TYPING SEROLOGIC ABO: CPT

## 2021-01-27 PROCEDURE — 99223 1ST HOSP IP/OBS HIGH 75: CPT | Performed by: SURGERY

## 2021-01-27 PROCEDURE — 83735 ASSAY OF MAGNESIUM: CPT

## 2021-01-27 PROCEDURE — 96375 TX/PRO/DX INJ NEW DRUG ADDON: CPT

## 2021-01-27 PROCEDURE — 83690 ASSAY OF LIPASE: CPT

## 2021-01-27 PROCEDURE — 80076 HEPATIC FUNCTION PANEL: CPT

## 2021-01-27 PROCEDURE — 81001 URINALYSIS AUTO W/SCOPE: CPT

## 2021-01-27 PROCEDURE — 86901 BLOOD TYPING SEROLOGIC RH(D): CPT

## 2021-01-27 PROCEDURE — 99283 EMERGENCY DEPT VISIT LOW MDM: CPT

## 2021-01-27 PROCEDURE — 85025 COMPLETE CBC W/AUTO DIFF WBC: CPT

## 2021-01-27 PROCEDURE — 96374 THER/PROPH/DIAG INJ IV PUSH: CPT

## 2021-01-27 PROCEDURE — 74018 RADEX ABDOMEN 1 VIEW: CPT

## 2021-01-27 PROCEDURE — 6360000002 HC RX W HCPCS: Performed by: PHYSICIAN ASSISTANT

## 2021-01-27 PROCEDURE — 2580000003 HC RX 258: Performed by: PHYSICIAN ASSISTANT

## 2021-01-27 PROCEDURE — 6360000004 HC RX CONTRAST MEDICATION: Performed by: PHYSICIAN ASSISTANT

## 2021-01-27 PROCEDURE — 74177 CT ABD & PELVIS W/CONTRAST: CPT

## 2021-01-27 PROCEDURE — 86850 RBC ANTIBODY SCREEN: CPT

## 2021-01-27 RX ORDER — PROMETHAZINE HYDROCHLORIDE 25 MG/ML
12.5 INJECTION, SOLUTION INTRAMUSCULAR; INTRAVENOUS ONCE
Status: COMPLETED | OUTPATIENT
Start: 2021-01-27 | End: 2021-01-27

## 2021-01-27 RX ORDER — KETOROLAC TROMETHAMINE 30 MG/ML
15 INJECTION, SOLUTION INTRAMUSCULAR; INTRAVENOUS ONCE
Status: COMPLETED | OUTPATIENT
Start: 2021-01-27 | End: 2021-01-27

## 2021-01-27 RX ORDER — SODIUM CHLORIDE, SODIUM LACTATE, POTASSIUM CHLORIDE, CALCIUM CHLORIDE 600; 310; 30; 20 MG/100ML; MG/100ML; MG/100ML; MG/100ML
1000 INJECTION, SOLUTION INTRAVENOUS ONCE
Status: COMPLETED | OUTPATIENT
Start: 2021-01-27 | End: 2021-01-27

## 2021-01-27 RX ADMIN — IOPAMIDOL 80 ML: 755 INJECTION, SOLUTION INTRAVENOUS at 21:08

## 2021-01-27 RX ADMIN — KETOROLAC TROMETHAMINE 15 MG: 30 INJECTION, SOLUTION INTRAMUSCULAR at 20:31

## 2021-01-27 RX ADMIN — SODIUM CHLORIDE, POTASSIUM CHLORIDE, SODIUM LACTATE AND CALCIUM CHLORIDE 1000 ML: 600; 310; 30; 20 INJECTION, SOLUTION INTRAVENOUS at 20:34

## 2021-01-27 RX ADMIN — PROMETHAZINE HYDROCHLORIDE 12.5 MG: 25 INJECTION INTRAMUSCULAR; INTRAVENOUS at 20:29

## 2021-01-27 ASSESSMENT — PAIN DESCRIPTION - DIRECTION: RADIATING_TOWARDS: INTO BACK

## 2021-01-27 ASSESSMENT — PAIN DESCRIPTION - PAIN TYPE: TYPE: ACUTE PAIN

## 2021-01-27 ASSESSMENT — PAIN DESCRIPTION - FREQUENCY: FREQUENCY: CONTINUOUS

## 2021-01-27 ASSESSMENT — PAIN DESCRIPTION - LOCATION: LOCATION: ABDOMEN

## 2021-01-28 ENCOUNTER — APPOINTMENT (OUTPATIENT)
Dept: GENERAL RADIOLOGY | Age: 61
DRG: 247 | End: 2021-01-28
Payer: COMMERCIAL

## 2021-01-28 PROBLEM — K56.609 SMALL BOWEL OBSTRUCTION (HCC): Status: ACTIVE | Noted: 2021-01-28

## 2021-01-28 LAB
ABO/RH: NORMAL
ALBUMIN SERPL-MCNC: 3.9 G/DL (ref 3.4–5)
ANION GAP SERPL CALCULATED.3IONS-SCNC: 12 MMOL/L (ref 3–16)
ANTIBODY SCREEN: NORMAL
BASOPHILS ABSOLUTE: 0 K/UL (ref 0–0.2)
BASOPHILS RELATIVE PERCENT: 0.3 %
BUN BLDV-MCNC: 11 MG/DL (ref 7–20)
CALCIUM SERPL-MCNC: 10.2 MG/DL (ref 8.3–10.6)
CHLORIDE BLD-SCNC: 100 MMOL/L (ref 99–110)
CO2: 25 MMOL/L (ref 21–32)
CREAT SERPL-MCNC: 0.8 MG/DL (ref 0.6–1.2)
EKG ATRIAL RATE: 85 BPM
EKG DIAGNOSIS: NORMAL
EKG P AXIS: 59 DEGREES
EKG P-R INTERVAL: 172 MS
EKG Q-T INTERVAL: 392 MS
EKG QRS DURATION: 80 MS
EKG QTC CALCULATION (BAZETT): 466 MS
EKG R AXIS: 44 DEGREES
EKG T AXIS: 35 DEGREES
EKG VENTRICULAR RATE: 85 BPM
EOSINOPHILS ABSOLUTE: 0.1 K/UL (ref 0–0.6)
EOSINOPHILS RELATIVE PERCENT: 1.1 %
GFR AFRICAN AMERICAN: >60
GFR NON-AFRICAN AMERICAN: >60
GLUCOSE BLD-MCNC: 76 MG/DL (ref 70–99)
GLUCOSE BLD-MCNC: 93 MG/DL (ref 70–99)
HCT VFR BLD CALC: 38 % (ref 36–48)
HEMOGLOBIN: 13.1 G/DL (ref 12–16)
INR BLD: 1.14 (ref 0.86–1.14)
LACTIC ACID: 0.8 MMOL/L (ref 0.4–2)
LYMPHOCYTES ABSOLUTE: 3.3 K/UL (ref 1–5.1)
LYMPHOCYTES RELATIVE PERCENT: 36.9 %
MAGNESIUM: 1.6 MG/DL (ref 1.8–2.4)
MCH RBC QN AUTO: 31.9 PG (ref 26–34)
MCHC RBC AUTO-ENTMCNC: 34.4 G/DL (ref 31–36)
MCV RBC AUTO: 92.9 FL (ref 80–100)
MONOCYTES ABSOLUTE: 0.8 K/UL (ref 0–1.3)
MONOCYTES RELATIVE PERCENT: 8.6 %
NEUTROPHILS ABSOLUTE: 4.8 K/UL (ref 1.7–7.7)
NEUTROPHILS RELATIVE PERCENT: 53.1 %
PDW BLD-RTO: 12.8 % (ref 12.4–15.4)
PERFORMED ON: NORMAL
PHOSPHORUS: 3.9 MG/DL (ref 2.5–4.9)
PLATELET # BLD: 242 K/UL (ref 135–450)
PMV BLD AUTO: 9.6 FL (ref 5–10.5)
POTASSIUM SERPL-SCNC: 3.1 MMOL/L (ref 3.5–5.1)
PROTHROMBIN TIME: 13.3 SEC (ref 10–13.2)
RBC # BLD: 4.1 M/UL (ref 4–5.2)
SODIUM BLD-SCNC: 137 MMOL/L (ref 136–145)
WBC # BLD: 9 K/UL (ref 4–11)

## 2021-01-28 PROCEDURE — 2580000003 HC RX 258: Performed by: STUDENT IN AN ORGANIZED HEALTH CARE EDUCATION/TRAINING PROGRAM

## 2021-01-28 PROCEDURE — 2500000003 HC RX 250 WO HCPCS: Performed by: STUDENT IN AN ORGANIZED HEALTH CARE EDUCATION/TRAINING PROGRAM

## 2021-01-28 PROCEDURE — 93005 ELECTROCARDIOGRAM TRACING: CPT | Performed by: STUDENT IN AN ORGANIZED HEALTH CARE EDUCATION/TRAINING PROGRAM

## 2021-01-28 PROCEDURE — 74019 RADEX ABDOMEN 2 VIEWS: CPT

## 2021-01-28 PROCEDURE — 93010 ELECTROCARDIOGRAM REPORT: CPT | Performed by: INTERNAL MEDICINE

## 2021-01-28 PROCEDURE — C9113 INJ PANTOPRAZOLE SODIUM, VIA: HCPCS | Performed by: STUDENT IN AN ORGANIZED HEALTH CARE EDUCATION/TRAINING PROGRAM

## 2021-01-28 PROCEDURE — 6360000002 HC RX W HCPCS

## 2021-01-28 PROCEDURE — 83605 ASSAY OF LACTIC ACID: CPT

## 2021-01-28 PROCEDURE — 83735 ASSAY OF MAGNESIUM: CPT

## 2021-01-28 PROCEDURE — 6360000002 HC RX W HCPCS: Performed by: STUDENT IN AN ORGANIZED HEALTH CARE EDUCATION/TRAINING PROGRAM

## 2021-01-28 PROCEDURE — 80069 RENAL FUNCTION PANEL: CPT

## 2021-01-28 PROCEDURE — 1200000000 HC SEMI PRIVATE

## 2021-01-28 PROCEDURE — 85610 PROTHROMBIN TIME: CPT

## 2021-01-28 PROCEDURE — 36415 COLL VENOUS BLD VENIPUNCTURE: CPT

## 2021-01-28 PROCEDURE — 85025 COMPLETE CBC W/AUTO DIFF WBC: CPT

## 2021-01-28 PROCEDURE — 99232 SBSQ HOSP IP/OBS MODERATE 35: CPT | Performed by: SURGERY

## 2021-01-28 RX ORDER — MAGNESIUM SULFATE IN WATER 40 MG/ML
4000 INJECTION, SOLUTION INTRAVENOUS ONCE
Status: COMPLETED | OUTPATIENT
Start: 2021-01-28 | End: 2021-01-28

## 2021-01-28 RX ORDER — MORPHINE SULFATE 4 MG/ML
INJECTION, SOLUTION INTRAMUSCULAR; INTRAVENOUS
Status: COMPLETED
Start: 2021-01-28 | End: 2021-01-28

## 2021-01-28 RX ORDER — MORPHINE SULFATE 4 MG/ML
4 INJECTION, SOLUTION INTRAMUSCULAR; INTRAVENOUS
Status: DISCONTINUED | OUTPATIENT
Start: 2021-01-28 | End: 2021-01-28

## 2021-01-28 RX ORDER — MORPHINE SULFATE 2 MG/ML
2 INJECTION, SOLUTION INTRAMUSCULAR; INTRAVENOUS
Status: DISCONTINUED | OUTPATIENT
Start: 2021-01-28 | End: 2021-01-28

## 2021-01-28 RX ORDER — SODIUM CHLORIDE 0.9 % (FLUSH) 0.9 %
10 SYRINGE (ML) INJECTION EVERY 12 HOURS SCHEDULED
Status: DISCONTINUED | OUTPATIENT
Start: 2021-01-28 | End: 2021-01-31 | Stop reason: HOSPADM

## 2021-01-28 RX ORDER — HYDRALAZINE HYDROCHLORIDE 20 MG/ML
10 INJECTION INTRAMUSCULAR; INTRAVENOUS EVERY 6 HOURS PRN
Status: DISCONTINUED | OUTPATIENT
Start: 2021-01-28 | End: 2021-01-31 | Stop reason: HOSPADM

## 2021-01-28 RX ORDER — SODIUM CHLORIDE 0.9 % (FLUSH) 0.9 %
10 SYRINGE (ML) INJECTION PRN
Status: DISCONTINUED | OUTPATIENT
Start: 2021-01-28 | End: 2021-01-31 | Stop reason: HOSPADM

## 2021-01-28 RX ORDER — SODIUM CHLORIDE, SODIUM LACTATE, POTASSIUM CHLORIDE, CALCIUM CHLORIDE 600; 310; 30; 20 MG/100ML; MG/100ML; MG/100ML; MG/100ML
INJECTION, SOLUTION INTRAVENOUS CONTINUOUS
Status: DISCONTINUED | OUTPATIENT
Start: 2021-01-28 | End: 2021-01-30

## 2021-01-28 RX ORDER — POTASSIUM CHLORIDE 7.45 MG/ML
10 INJECTION INTRAVENOUS
Status: DISPENSED | OUTPATIENT
Start: 2021-01-28 | End: 2021-01-28

## 2021-01-28 RX ORDER — POTASSIUM CHLORIDE 7.45 MG/ML
10 INJECTION INTRAVENOUS
Status: DISCONTINUED | OUTPATIENT
Start: 2021-01-28 | End: 2021-01-28 | Stop reason: CLARIF

## 2021-01-28 RX ORDER — MORPHINE SULFATE 2 MG/ML
2 INJECTION, SOLUTION INTRAMUSCULAR; INTRAVENOUS
Status: DISCONTINUED | OUTPATIENT
Start: 2021-01-28 | End: 2021-01-29

## 2021-01-28 RX ORDER — DEXTROSE MONOHYDRATE 50 MG/ML
100 INJECTION, SOLUTION INTRAVENOUS PRN
Status: DISCONTINUED | OUTPATIENT
Start: 2021-01-28 | End: 2021-01-31 | Stop reason: HOSPADM

## 2021-01-28 RX ORDER — NICOTINE POLACRILEX 4 MG
15 LOZENGE BUCCAL PRN
Status: DISCONTINUED | OUTPATIENT
Start: 2021-01-28 | End: 2021-01-31 | Stop reason: HOSPADM

## 2021-01-28 RX ORDER — DEXTROSE MONOHYDRATE 25 G/50ML
12.5 INJECTION, SOLUTION INTRAVENOUS PRN
Status: DISCONTINUED | OUTPATIENT
Start: 2021-01-28 | End: 2021-01-31 | Stop reason: HOSPADM

## 2021-01-28 RX ORDER — PANTOPRAZOLE SODIUM 40 MG/10ML
40 INJECTION, POWDER, LYOPHILIZED, FOR SOLUTION INTRAVENOUS DAILY
Status: DISCONTINUED | OUTPATIENT
Start: 2021-01-28 | End: 2021-01-31

## 2021-01-28 RX ORDER — ONDANSETRON 4 MG/1
4 TABLET, ORALLY DISINTEGRATING ORAL EVERY 8 HOURS PRN
Status: DISCONTINUED | OUTPATIENT
Start: 2021-01-28 | End: 2021-01-31 | Stop reason: HOSPADM

## 2021-01-28 RX ORDER — ONDANSETRON 2 MG/ML
4 INJECTION INTRAMUSCULAR; INTRAVENOUS EVERY 6 HOURS PRN
Status: DISCONTINUED | OUTPATIENT
Start: 2021-01-28 | End: 2021-01-31 | Stop reason: HOSPADM

## 2021-01-28 RX ORDER — MORPHINE SULFATE 2 MG/ML
4 INJECTION, SOLUTION INTRAMUSCULAR; INTRAVENOUS
Status: DISCONTINUED | OUTPATIENT
Start: 2021-01-28 | End: 2021-01-29

## 2021-01-28 RX ADMIN — SODIUM CHLORIDE, POTASSIUM CHLORIDE, SODIUM LACTATE AND CALCIUM CHLORIDE: 600; 310; 30; 20 INJECTION, SOLUTION INTRAVENOUS at 06:17

## 2021-01-28 RX ADMIN — ENOXAPARIN SODIUM 40 MG: 40 INJECTION SUBCUTANEOUS at 08:25

## 2021-01-28 RX ADMIN — MORPHINE SULFATE 2 MG: 2 INJECTION, SOLUTION INTRAMUSCULAR; INTRAVENOUS at 08:25

## 2021-01-28 RX ADMIN — HYDRALAZINE HYDROCHLORIDE 10 MG: 20 INJECTION INTRAMUSCULAR; INTRAVENOUS at 06:19

## 2021-01-28 RX ADMIN — PANTOPRAZOLE SODIUM 40 MG: 40 INJECTION, POWDER, FOR SOLUTION INTRAVENOUS at 17:16

## 2021-01-28 RX ADMIN — Medication 10 ML: at 21:00

## 2021-01-28 RX ADMIN — Medication 10 ML: at 08:26

## 2021-01-28 RX ADMIN — POTASSIUM CHLORIDE 10 MEQ: 7.46 INJECTION, SOLUTION INTRAVENOUS at 18:02

## 2021-01-28 RX ADMIN — POTASSIUM CHLORIDE 10 MEQ: 7.46 INJECTION, SOLUTION INTRAVENOUS at 17:16

## 2021-01-28 RX ADMIN — MAGNESIUM SULFATE 4000 MG: 4 INJECTION INTRAVENOUS at 11:47

## 2021-01-28 RX ADMIN — MORPHINE SULFATE 4 MG: 4 INJECTION INTRAVENOUS at 01:18

## 2021-01-28 RX ADMIN — MORPHINE SULFATE 4 MG: 4 INJECTION, SOLUTION INTRAMUSCULAR; INTRAVENOUS at 01:18

## 2021-01-28 RX ADMIN — POTASSIUM CHLORIDE 10 MEQ: 7.46 INJECTION, SOLUTION INTRAVENOUS at 15:55

## 2021-01-28 RX ADMIN — POTASSIUM PHOSPHATE, MONOBASIC AND POTASSIUM PHOSPHATE, DIBASIC 30 MMOL: 224; 236 INJECTION, SOLUTION, CONCENTRATE INTRAVENOUS at 10:43

## 2021-01-28 ASSESSMENT — PAIN SCALES - GENERAL
PAINLEVEL_OUTOF10: 6
PAINLEVEL_OUTOF10: 8
PAINLEVEL_OUTOF10: 0

## 2021-01-28 ASSESSMENT — PAIN DESCRIPTION - PAIN TYPE: TYPE: ACUTE PAIN

## 2021-01-28 ASSESSMENT — PAIN DESCRIPTION - DIRECTION: RADIATING_TOWARDS: BACK

## 2021-01-28 ASSESSMENT — PAIN DESCRIPTION - PROGRESSION: CLINICAL_PROGRESSION: GRADUALLY WORSENING

## 2021-01-28 ASSESSMENT — PAIN DESCRIPTION - FREQUENCY: FREQUENCY: CONTINUOUS

## 2021-01-28 ASSESSMENT — PAIN DESCRIPTION - ONSET: ONSET: ON-GOING

## 2021-01-28 NOTE — ED NOTES
Report called to [facility] for ED16 going to 3310. All questions answered. Patient will be transported on ED stretcher with SABAS Cadet as escort.       Luis Alberto Alvarez RN  01/28/21 4183

## 2021-01-28 NOTE — PLAN OF CARE
Problem: Falls - Risk of:  Goal: Will remain free from falls  Description: Will remain free from falls  1/28/2021 1207 by Kelli Lainez RN  Outcome: Ongoing     Problem: Falls - Risk of:  Goal: Absence of physical injury  Description: Absence of physical injury  Outcome: Ongoing     Problem: Infection:  Goal: Will remain free from infection  Description: Will remain free from infection  Outcome: Ongoing     Problem: Safety:  Goal: Free from accidental physical injury  Description: Free from accidental physical injury  Outcome: Ongoing     Problem: Daily Care:  Goal: Daily care needs are met  Description: Daily care needs are met  1/28/2021 1207 by Kelli Lainez RN  Outcome: Ongoing     Problem: Pain:  Goal: Patient's pain/discomfort is manageable  Description: Patient's pain/discomfort is manageable  Outcome: Ongoing

## 2021-01-28 NOTE — PLAN OF CARE
Problem: Falls - Risk of:  Goal: Will remain free from falls  Description: Will remain free from falls  Outcome: Ongoing  Note: Client remains free from falls, bed/chair alarm in place, door open, encouraged to use call light for needs, call light is within reach, bed locked in lowest position,  Will continue to monitor.        Problem: Daily Care:  Goal: Daily care needs are met  Description: Daily care needs are met  Outcome: Ongoing  Note: Purposeful rounding performed

## 2021-01-28 NOTE — ED PROVIDER NOTES
[facility]  EMERGENCY DEPARTMENT ENCOUNTER          PHYSICIAN ASSISTANT NOTE       *[facility] has declared a state of emergency regarding the [address] pandemic*    Date of evaluation: 1/27/2021    Chief Complaint     Abdominal Pain (RUQ into back since last night, unable to eat or keep anything down)      History of Present Illness     Milady Rivero is a 61 y.o. female with a history of remote cholecystectomy, appendectomy, and hysterectomy as well as hypertension and hyperlipidemia presents today with 2 weeks of abdominal pain. Patient describes a sharp pain in her right upper quadrant that radiates towards her back. It is associated with nausea and vomiting. It is almost always consistently postprandial but can be exacerbated by movement. She denies any bloody component to her vomitus. She does feel that she is constipated having less frequent bowel movements, and feels that she is urinating much much less. She denies any fevers, body aches, chills, sweats. 3 weeks ago she thought she might have Covid but no longer feels like she does. Review of Systems     As documented in the HPI, otherwise all other systems were reviewed and were negative. Past Medical, Surgical, Family, and Social History     She has a past medical history of Chronic pain and Hypertension. She has a past surgical history that includes Hysterectomy; Appendectomy; and Cholecystectomy. Her family history is not on file. She reports that she has been smoking. She has never used smokeless tobacco. She reports current drug use. Drug: Marijuana. She reports that she does not drink alcohol.     Medications     Previous Medications    ACETAMINOPHEN (TYLENOL PO)    Take by mouth    AMLODIPINE (NORVASC) 5 MG TABLET    Take 1 tablet by mouth daily    CETIRIZINE (ZYRTEC ALLERGY) 10 MG TABLET    Take 1 tablet by mouth daily    CHLORTHALIDONE PO    Take by mouth DOCUSATE SODIUM (COLACE) 100 MG CAPSULE    Take 100 mg by mouth 2 times daily    HYDROCHLOROTHIAZIDE (HYDRODIURIL) 25 MG TABLET    Take 1 tablet by mouth daily    HYDROCHLOROTHIAZIDE (HYDRODIURIL) 25 MG TABLET    Take 1 tablet by mouth every morning    IBUPROFEN (IBU) 600 MG TABLET    Take 1 tablet by mouth every 8 hours as needed for Pain    MULTIPLE VITAMINS-MINERALS (THERAPEUTIC MULTIVITAMIN-MINERALS) TABLET    Take 1 tablet by mouth daily    ROSUVASTATIN CALCIUM PO    Take by mouth    SPIRONOLACTONE PO    Take by mouth       Allergies     She is allergic to aspirin and pcn [penicillins]. Physical Exam     INITIAL VITALS: BP: (!) 172/128, Temp: 98.3 °F (36.8 °C), Pulse: 120, Resp: 18, SpO2: 98 %     General: Well appearing, well nourished, in no apparent state of distress. HEENT:  Normocephalic, atraumatic. Pupils equal, sclera white. Handling secretions without difficulty. Neck: No meningismus. Trachea midline    Pulmonary: Respirations even. Non labored. No tachypnea. Good air movement throughout    Cardiac: Chest symmetrical and non-tender on palpation of chest wall. RRR. no M/R/G. Abdomen:  Non-distended. Decreased bowel sounds bowel sounds present in all quadrants. Focal tenderness in the right upper quadrant with some rebound component in the right lower quadrant and suprapubic area. No overlying rash. No flank tenderness on percussion. Musculoskeletal:  Ambulates under own control. Neuro:  Alert and oriented x 3. CN II - XII grossly intact. Moves all extremities spontaneously.     Vascular:  2+ peripheral pulses in bilateral upper and lower extremities      Skin:  Warm and well perfused without rashes or lesions    Psych:  Appropriate mood and affect        Diagnostic Results     EKG   Interpreted in conjunction with emergency department physician Daniel Solomon, *    RADIOLOGY:  XR ABDOMEN (KUB) (SINGLE AP VIEW)   Final Result 1.  NG tube tip in the proximal stomach in the left upper quadrant. 2.  Dilated small bowel loops in the midabdomen. Correlates with recent    CT findings of bowel obstruction. CT ABDOMEN PELVIS W IV CONTRAST Additional Contrast? None   Final Result   1. Small bowel obstruction with obstructive transition zone in the right lower quadrant, hemiabdomen/parasagittal to the umbilicus with decompressed distal ileum   2. No free pelvic fluid or mesenteric fat stranding.    3. Status post cholecystectomy          LABS:   Results for orders placed or performed during the hospital encounter of 85/38/41   Basic Metabolic Panel w/ Reflex to MG   Result Value Ref Range    Sodium 136 136 - 145 mmol/L    Potassium reflex Magnesium 3.3 (L) 3.5 - 5.1 mmol/L    Chloride 97 (L) 99 - 110 mmol/L    CO2 25 21 - 32 mmol/L    Anion Gap 14 3 - 16    Glucose 106 (H) 70 - 99 mg/dL    BUN 11 7 - 20 mg/dL    CREATININE 0.8 0.6 - 1.2 mg/dL    GFR Non-African American >60 >60    GFR African American >60 >60    Calcium 10.5 8.3 - 10.6 mg/dL   Hepatic Function Panel   Result Value Ref Range    Total Protein 8.0 6.4 - 8.2 g/dL    Albumin 4.3 3.4 - 5.0 g/dL    Alkaline Phosphatase 99 40 - 129 U/L    ALT 17 10 - 40 U/L    AST 16 15 - 37 U/L    Total Bilirubin 0.4 0.0 - 1.0 mg/dL    Bilirubin, Direct <0.2 0.0 - 0.3 mg/dL    Bilirubin, Indirect see below 0.0 - 1.0 mg/dL   Lipase   Result Value Ref Range    Lipase 9.0 (L) 13.0 - 60.0 U/L   Urinalysis, reflex to microscopic   Result Value Ref Range    Color, UA Yellow Straw/Yellow    Clarity, UA SL CLOUDY (A) Clear    Glucose, Ur Negative Negative mg/dL    Bilirubin Urine MODERATE (A) Negative    Ketones, Urine 15 (A) Negative mg/dL    Specific Gravity, UA >=1.030 1.005 - 1.030    Blood, Urine MODERATE (A) Negative    pH, UA 6.0 5.0 - 8.0    Protein, UA 30 (A) Negative mg/dL    Urobilinogen, Urine 0.2 <2.0 E.U./dL    Nitrite, Urine Negative Negative Leukocyte Esterase, Urine Negative Negative    Microscopic Examination YES     Urine Type NotGiven    CBC Auto Differential   Result Value Ref Range    WBC 11.3 (H) 4.0 - 11.0 K/uL    RBC 4.41 4.00 - 5.20 M/uL    Hemoglobin 14.0 12.0 - 16.0 g/dL    Hematocrit 41.5 36.0 - 48.0 %    MCV 94.1 80.0 - 100.0 fL    MCH 31.8 26.0 - 34.0 pg    MCHC 33.8 31.0 - 36.0 g/dL    RDW 12.8 12.4 - 15.4 %    Platelets 956 408 - 861 K/uL    MPV 9.2 5.0 - 10.5 fL    Neutrophils % 61.8 %    Lymphocytes % 28.1 %    Monocytes % 8.5 %    Eosinophils % 1.0 %    Basophils % 0.6 %    Neutrophils Absolute 7.0 1.7 - 7.7 K/uL    Lymphocytes Absolute 3.2 1.0 - 5.1 K/uL    Monocytes Absolute 1.0 0.0 - 1.3 K/uL    Eosinophils Absolute 0.1 0.0 - 0.6 K/uL    Basophils Absolute 0.1 0.0 - 0.2 K/uL   Microscopic Urinalysis   Result Value Ref Range    Hyaline Casts, UA >40 (A) 0 - 2 /LPF    Mucus, UA 2+ (A) None Seen /LPF    WBC, UA 3-5 0 - 5 /HPF    RBC, UA  (A) 0 - 4 /HPF    Epithelial Cells, UA 6-10 (A) 0 - 5 /HPF    Bacteria, UA 2+ (A) None Seen /HPF   Magnesium   Result Value Ref Range    Magnesium 1.80 1.80 - 2.40 mg/dL   SPECIMEN REJECTION   Result Value Ref Range    Rejected Test LACID     Reason for Rejection see below        ED BEDSIDE ULTRASOUND:      RECENT VITALS:  BP: (!) 159/95, Temp: 98.3 °F (36.8 °C), Pulse: 88, Resp: 18, SpO2: 97 %     Procedures         ED Course     Nursing Notes, Past Medical Hx, Past Surgical Hx, Social Hx, Allergies, and Family Hx were reviewed.     The patient was given the following medications:  Orders Placed This Encounter   Medications    lactated ringers infusion 1,000 mL    promethazine (PHENERGAN) injection 12.5 mg    ketorolac (TORADOL) injection 15 mg    iopamidol (ISOVUE-370) 76 % injection 80 mL       CONSULTS:  C/ Humble Pennington 19 / Armani Huang / Xavier Brewer Lilly Jang is a 61 y.o. female with a history as noted above presents today with 2 weeks of postprandial right upper quadrant discomfort with associated constipation and oliguria. On my examination she has reproducible pain in the right upper quadrant with some rebound component in the right lower and suprapubic area but no flank tenderness. Differential diagnosis at this time includes but is not limited to: Bowel obstruction, bile duct stone, pancreatitis, hepatitis, constipation, gastroparesis, less likely pyelonephritis, infected nephrolithiasis, AAA, ischemic bowel, or other etiology. CT the abdomen pelvis notable for a small bowel obstruction with transition zone in the right lower quadrant. Remainder of her labs are notable for notable hematuria but no findings suggestive of infectious cystitis (negative nitrite, negative leukocyte Estrace). Minor leukocytosis. Lactic acid is pending. General surgery was contacted for recommendations. NG tube was placed to wall suction and she will be admitted to the surgery service. This patient was also evaluated by the attending physician. All care plans were discussed and agreed upon. Clinical Impression     1. Small bowel obstruction (HCC)        Disposition     PATIENT REFERRED TO:  No follow-up provider specified.     DISCHARGE MEDICATIONS:  New Prescriptions    No medications on file       DISPOSITION Decision To Admit 01/27/2021 10:21:05 PM        Lenora Yo PA-C  01/28/21 0010

## 2021-01-28 NOTE — ED NOTES
Patient wants information given only to Luis E Posada ([facility]) [phone]     Tiffanie Waters RN  01/27/21 3721

## 2021-01-28 NOTE — PROGRESS NOTES
Report was called to Janet Garcia on [address]. Transportation is to transfer patient as ordered.

## 2021-01-28 NOTE — ED NOTES
Bed: B16-16  Expected date:   Expected time:   Means of arrival:   Comments:  Aryan Danielle RN  01/27/21 Trung Gillespie

## 2021-01-28 NOTE — ED NOTES
Mint Green, Lavender, Blue, Yellow/Orange blood tubes collected and sent to lab.         Opal Ortiz RN  01/27/21 2039

## 2021-01-28 NOTE — PROGRESS NOTES
Surgery Daily Progress Note      CC: SBO    SUBJECTIVE:  No acute events overnight. Afebrile, hypertensive. NG with 400 out. Patient states her pain is mildly improved, denies nausea this AM. She denies having flatus or bowel movements. ROS:   A 14 point review of systems was conducted, significant findings as noted above. All other systems negative. OBJECTIVE:    PHYSICAL EXAM:  Vitals:    01/27/21 2341 01/28/21 0000 01/28/21 0139 01/28/21 0600   BP:  (!) 143/84 (!) 161/83 (!) 160/104   Pulse: 88  91 86   Resp:   18 18   Temp:   98 °F (36.7 °C) 98 °F (36.7 °C)   TempSrc:   Oral Oral   SpO2:  95% 99% 98%   Weight:    198 lb 8 oz (90 kg)   Height:           General appearance: sitting up in bed, in NAD  Neuro: A&Ox3, no focal deficits, sensation intact  HEENT: NG tube to LWS, gastric output  Chest/Lungs: normal effort, no accessory muscle use, on RA  Cardiovascular: RRR  Abdomen: soft, minimally-tender, distended, no guarding/rigidity    Extremities: no edema, no cyanosis      ASSESSMENT & PLAN:   This is a 61 y.o. female with a diagnosis of SBO    - NPO, NG to CLWS  - will place gastrografin per NG today, recheck Abd X-ray, if no progress with contrast will consider OR  - continue IV pain meds  - hydralazine PRN  - follow up AM labs, will have to replace electrolytes aggressively  - strict I/Os  - OOB, ambulate, IS       Tristen Baptiste DO  01/28/21  6:35 AM  [phone]    I have seen, examined, and reviewed the patients chart. I agree with the residents assessment and have made appropriate changes.     Bradford Mullins

## 2021-01-28 NOTE — H&P
General Surgery   Resident H&P    Chief Complaint: Abdominal pain    History of Present Illness:   Cholo Mora is a 61 y.o. female with history of hypertension who presents to [facility] ED with generally worsening abdominal pain over the past 1-2 weeks. She reports initially developing pain about two weeks ago throughout her abdomen, worst within the RUQ and periumbilically, that eventually subsided without intervention. It then returned about two days ago, in addition to developing nausea and vomiting such that she has been unable to keep any food or liquids down since then. She reports last having a BM on Monday, and she has not been passing flatus either. She denies any other smyptoms at this time, including fever, chills, chest pain, or dyspnea. She does note having a surgical history of cholecystectomy, appendectomy, and hysterectomy. Past Medical History:        Diagnosis Date    Chronic pain     Hypertension        Past Surgical History:        Procedure Laterality Date    APPENDECTOMY      CHOLECYSTECTOMY      HYSTERECTOMY         Allergies:  Aspirin and Pcn [penicillins]    Medications:   Home Meds  No current facility-administered medications on file prior to encounter.       Current Outpatient Medications on File Prior to Encounter   Medication Sig Dispense Refill    amLODIPine (NORVASC) 5 MG tablet Take 1 tablet by mouth daily 30 tablet 0    hydroCHLOROthiazide (HYDRODIURIL) 25 MG tablet Take 1 tablet by mouth every morning 14 tablet 0    ROSUVASTATIN CALCIUM PO Take by mouth      SPIRONOLACTONE PO Take by mouth      CHLORTHALIDONE PO Take by mouth      cetirizine (ZYRTEC ALLERGY) 10 MG tablet Take 1 tablet by mouth daily 20 tablet 0    Acetaminophen (TYLENOL PO) Take by mouth      docusate sodium (COLACE) 100 MG capsule Take 100 mg by mouth 2 times daily      ibuprofen (IBU) 600 MG tablet Take 1 tablet by mouth every 8 hours as needed for Pain 20 tablet 0  Multiple Vitamins-Minerals (THERAPEUTIC MULTIVITAMIN-MINERALS) tablet Take 1 tablet by mouth daily      hydrochlorothiazide (HYDRODIURIL) 25 MG tablet Take 1 tablet by mouth daily 30 tablet 2       Current Meds  No current facility-administered medications for this encounter. Family History:   History reviewed. No pertinent family history. Social History:   TOBACCO:   reports that she has been smoking. She has never used smokeless tobacco.  ETOH:   reports no history of alcohol use. DRUGS:   reports current drug use. Drug: Marijuana. Review of Systems:     Constitutional: Negative. HENT: Negative. Eyes: Negative. Respiratory: Negative. Cardiovascular: Negative. Gastrointestinal: Negative except for pain, nausea, vomiting. Genitourinary: Negative. Musculoskeletal: Negative. Skin: Negative. Endocrine: Negative. Allergic/Immunologic: Negative. Neurological: Negative. Hematological: Negative. Psychiatric/Behavioral: Negative.     Physical exam:    Vitals:    01/27/21 1753 01/27/21 2333 01/27/21 2341 01/28/21 0000   BP: (!) 172/128 (!) 159/95  (!) 143/84   Pulse: 120  88    Resp: 18      Temp: 98.3 °F (36.8 °C)      TempSrc: Oral      SpO2: 98% 97%  95%   Weight: 200 lb (90.7 kg)      Height: 5' 2\" (1.575 m)          General appearance: alert, no acute distress, grooming appropriate  HEENT: Normocephalic, atraumatic; EOMI; moist mucous membranes  Neck: trachea midline, no apparent JVD  Chest/Lungs: Normal inspiratory effort, symmetric chest rise, no accessory muscle use  Cardiovascular: Tachycardic, regular rhythm; hypertensive  Abdomen: soft, obese, mildly tender to palpation throughout but moreso within the RUQ; no guarding/rebound  Skin: warm and dry, no rashes  Extremities: no edema, no cyanosis  Neuro: A&Ox3, no gross sensory or motor neuro deficits    Labs:    CBC:   Recent Labs     01/27/21 2037   WBC 11.3*   HGB 14.0   HCT 41.5   MCV 94.1        BMP:   Recent Labs 01/27/21 2037      K 3.3*   CL 97*   CO2 25   BUN 11   CREATININE 0.8     Liver Profile:   Lab Results   Component Value Date    AST 16 01/27/2021    ALT 17 01/27/2021    BILIDIR <0.2 01/27/2021    BILITOT 0.4 01/27/2021    ALKPHOS 99 01/27/2021   No results found for: CHOL, HDL, TRIG  PT/INR: No results for input(s): PROTIME, INR in the last 72 hours. Imaging:   XR ABDOMEN (KUB) (SINGLE AP VIEW)   Final Result   1. NG tube tip in the proximal stomach in the left upper quadrant. 2.  Dilated small bowel loops in the midabdomen. Correlates with recent    CT findings of bowel obstruction. CT ABDOMEN PELVIS W IV CONTRAST Additional Contrast? None   Final Result   1. Small bowel obstruction with obstructive transition zone in the right lower quadrant, hemiabdomen/parasagittal to the umbilicus with decompressed distal ileum   2. No free pelvic fluid or mesenteric fat stranding. 3. Status post cholecystectomy            Assessment/Plan:  Yoselin Buitrago is a 61 y.o. female with history of hypertension and multiple prior abdominal surgeries who presents to [facility] ED with abdominal pain, nausea, and vomiting. History, exam, and imaging most consistent with small bowel obstruction. - Will admit to general surgery, med/surg  - NG tube placed within the stomach, to low wall suction  - Continue IV fluids  - Prn pain/nausea control; avoid narcotic use when possible   - Monitor vital signs, I/Os  - Diet: NPO  - Patient, plan discussed with surgery team, surgical staff on-call, Dr. Neymar Long MD PGY-1  01/28/21  12:25 AM  [phone]     I have seen, examined, and reviewed the patients chart. I agree with the residents assessment and have made appropriate changes.     Sathish Bar

## 2021-01-29 ENCOUNTER — APPOINTMENT (OUTPATIENT)
Dept: GENERAL RADIOLOGY | Age: 61
DRG: 247 | End: 2021-01-29
Payer: COMMERCIAL

## 2021-01-29 LAB
ALBUMIN SERPL-MCNC: 3.8 G/DL (ref 3.4–5)
ANION GAP SERPL CALCULATED.3IONS-SCNC: 11 MMOL/L (ref 3–16)
BASOPHILS ABSOLUTE: 0 K/UL (ref 0–0.2)
BASOPHILS RELATIVE PERCENT: 0.2 %
BUN BLDV-MCNC: 9 MG/DL (ref 7–20)
CALCIUM SERPL-MCNC: 10 MG/DL (ref 8.3–10.6)
CHLORIDE BLD-SCNC: 102 MMOL/L (ref 99–110)
CO2: 28 MMOL/L (ref 21–32)
CREAT SERPL-MCNC: 0.6 MG/DL (ref 0.6–1.2)
EOSINOPHILS ABSOLUTE: 0.1 K/UL (ref 0–0.6)
EOSINOPHILS RELATIVE PERCENT: 0.9 %
GFR AFRICAN AMERICAN: >60
GFR NON-AFRICAN AMERICAN: >60
GLUCOSE BLD-MCNC: 77 MG/DL (ref 70–99)
GLUCOSE BLD-MCNC: 78 MG/DL (ref 70–99)
GLUCOSE BLD-MCNC: 92 MG/DL (ref 70–99)
HCT VFR BLD CALC: 37.6 % (ref 36–48)
HEMOGLOBIN: 12.8 G/DL (ref 12–16)
LYMPHOCYTES ABSOLUTE: 1.7 K/UL (ref 1–5.1)
LYMPHOCYTES RELATIVE PERCENT: 22.6 %
MAGNESIUM: 2.1 MG/DL (ref 1.8–2.4)
MCH RBC QN AUTO: 32 PG (ref 26–34)
MCHC RBC AUTO-ENTMCNC: 33.9 G/DL (ref 31–36)
MCV RBC AUTO: 94.4 FL (ref 80–100)
MONOCYTES ABSOLUTE: 0.6 K/UL (ref 0–1.3)
MONOCYTES RELATIVE PERCENT: 8.3 %
NEUTROPHILS ABSOLUTE: 5.2 K/UL (ref 1.7–7.7)
NEUTROPHILS RELATIVE PERCENT: 68 %
PDW BLD-RTO: 12.8 % (ref 12.4–15.4)
PERFORMED ON: NORMAL
PERFORMED ON: NORMAL
PHOSPHORUS: 3.1 MG/DL (ref 2.5–4.9)
PLATELET # BLD: 207 K/UL (ref 135–450)
PMV BLD AUTO: 9.4 FL (ref 5–10.5)
POTASSIUM SERPL-SCNC: 3.5 MMOL/L (ref 3.5–5.1)
RBC # BLD: 3.98 M/UL (ref 4–5.2)
SODIUM BLD-SCNC: 141 MMOL/L (ref 136–145)
WBC # BLD: 7.6 K/UL (ref 4–11)

## 2021-01-29 PROCEDURE — 80069 RENAL FUNCTION PANEL: CPT

## 2021-01-29 PROCEDURE — 6360000002 HC RX W HCPCS: Performed by: STUDENT IN AN ORGANIZED HEALTH CARE EDUCATION/TRAINING PROGRAM

## 2021-01-29 PROCEDURE — 97166 OT EVAL MOD COMPLEX 45 MIN: CPT

## 2021-01-29 PROCEDURE — 2580000003 HC RX 258: Performed by: STUDENT IN AN ORGANIZED HEALTH CARE EDUCATION/TRAINING PROGRAM

## 2021-01-29 PROCEDURE — 36415 COLL VENOUS BLD VENIPUNCTURE: CPT

## 2021-01-29 PROCEDURE — 97535 SELF CARE MNGMENT TRAINING: CPT

## 2021-01-29 PROCEDURE — 99232 SBSQ HOSP IP/OBS MODERATE 35: CPT | Performed by: SURGERY

## 2021-01-29 PROCEDURE — 83735 ASSAY OF MAGNESIUM: CPT

## 2021-01-29 PROCEDURE — 97530 THERAPEUTIC ACTIVITIES: CPT

## 2021-01-29 PROCEDURE — 85025 COMPLETE CBC W/AUTO DIFF WBC: CPT

## 2021-01-29 PROCEDURE — 97162 PT EVAL MOD COMPLEX 30 MIN: CPT

## 2021-01-29 PROCEDURE — 74018 RADEX ABDOMEN 1 VIEW: CPT

## 2021-01-29 PROCEDURE — 97116 GAIT TRAINING THERAPY: CPT

## 2021-01-29 PROCEDURE — C9113 INJ PANTOPRAZOLE SODIUM, VIA: HCPCS | Performed by: STUDENT IN AN ORGANIZED HEALTH CARE EDUCATION/TRAINING PROGRAM

## 2021-01-29 PROCEDURE — 1200000000 HC SEMI PRIVATE

## 2021-01-29 RX ORDER — POTASSIUM CHLORIDE 7.45 MG/ML
10 INJECTION INTRAVENOUS
Status: COMPLETED | OUTPATIENT
Start: 2021-01-29 | End: 2021-01-29

## 2021-01-29 RX ADMIN — POTASSIUM CHLORIDE 10 MEQ: 10 INJECTION, SOLUTION INTRAVENOUS at 12:24

## 2021-01-29 RX ADMIN — PANTOPRAZOLE SODIUM 40 MG: 40 INJECTION, POWDER, FOR SOLUTION INTRAVENOUS at 08:32

## 2021-01-29 RX ADMIN — Medication 10 ML: at 08:32

## 2021-01-29 RX ADMIN — SODIUM CHLORIDE, POTASSIUM CHLORIDE, SODIUM LACTATE AND CALCIUM CHLORIDE: 600; 310; 30; 20 INJECTION, SOLUTION INTRAVENOUS at 08:21

## 2021-01-29 RX ADMIN — POTASSIUM CHLORIDE 10 MEQ: 10 INJECTION, SOLUTION INTRAVENOUS at 08:32

## 2021-01-29 RX ADMIN — POTASSIUM CHLORIDE 10 MEQ: 10 INJECTION, SOLUTION INTRAVENOUS at 17:18

## 2021-01-29 RX ADMIN — POTASSIUM CHLORIDE 10 MEQ: 10 INJECTION, SOLUTION INTRAVENOUS at 11:24

## 2021-01-29 RX ADMIN — ENOXAPARIN SODIUM 40 MG: 40 INJECTION SUBCUTANEOUS at 08:32

## 2021-01-29 ASSESSMENT — PAIN SCALES - GENERAL
PAINLEVEL_OUTOF10: 0
PAINLEVEL_OUTOF10: 0

## 2021-01-29 NOTE — PROGRESS NOTES
Surgery Daily Progress Note      CC: SBO    SUBJECTIVE:  Patient rested well overnight. Remained afebrile, HDS. Had loose bowel movement after contrast yesterday. Reports some residual pain in her lower abdomen, but states overall it is much improved. Denies flatus overnight. Has been OOB to bathroom to urinate. ROS:   A 14 point review of systems was conducted, significant findings as noted above. All other systems negative. OBJECTIVE:    PHYSICAL EXAM:  Vitals:    01/28/21 1856 01/28/21 1942 01/28/21 2350 01/29/21 0347   BP: (!) 123/91 138/71 133/70 129/74   Pulse: 91 88 86 96   Resp: 18 18 16 18   Temp: 97.6 °F (36.4 °C) 98.7 °F (37.1 °C) 98.6 °F (37 °C) 99 °F (37.2 °C)   TempSrc: Oral Oral Oral Oral   SpO2: 96% 94% 95% 91%   Weight:       Height:           General appearance: sitting up in bed, in NAD  Neuro: A&Ox3, no focal deficits, sensation intact  HEENT: NG tube to LWS, gastric output  Chest/Lungs: normal effort, no accessory muscle use, on RA  Cardiovascular: RRR  Abdomen: soft, minimally-tender, distended, no guarding/rigidity  Extremities: no edema, no cyanosis      ASSESSMENT & PLAN:   This is a 61 y.o. female with a diagnosis of SBO    - KUB this AM with contrast progressed to colon and rectum, no residual contrast seen in small bowel   - NG removed this AM, ok for CLD, if fails may still need diagnostic lap  - will monitor for continued resolution of pain and continued bowel function  - continue IV pain meds  - hydralazine PRN  - replaced potassium this AM  - strict I/Os  - OOB, ambulate, IS   - PT/OT ordered       Jennet Duane, DO  01/29/21  6:42 AM  [phone]    I have seen, examined, and reviewed the patients chart. I agree with the residents assessment and have made appropriate changes.     Yogesh Garcia

## 2021-01-29 NOTE — PLAN OF CARE
Problem: Falls - Risk of:  Goal: Will remain free from falls  Description: Will remain free from falls  Outcome: Ongoing  Note: Patient remains free from physical injury. Fall precautions in place: Patient in bed lowest position,wheels locked. 2/4 side rails up Call light and beside table within reach.  Will continue to monitor

## 2021-01-29 NOTE — CARE COORDINATION
Patient's surgery cancelled and NG taken out. Patient is consuming CLD PO with no nausea and vomiting. Audible bowel sounds and  Had BM yesterday after contrast. Patient stated no needs at discharge. OT= 20, PT=18 (both evals today). CM will continue to follow patient until discharge.  Electronically signed by Juan Luis Cummins RN on 1/29/2021 at 12:57 PM
Freedom of choice list was provided with basic dialogue that supports the patient's individualized plan of care/goals and shares the quality data associated with the providers.  Not Indicated    Care Transition patient: Tara Angelo RN  [facility] [employer]  Case Management Department  Ph: [phone]

## 2021-01-29 NOTE — PROGRESS NOTES
Patient consuming CLD with no complaints of n/v    Electronically signed by Martha Isidro RN on 1/29/2021 at 12:48 PM

## 2021-01-29 NOTE — PROGRESS NOTES
NUTRITION ASSESSMENT  Admission Date: 1/27/2021     Type and Reason for Visit: Initial, Positive Nutrition Screen    NUTRITION RECOMMENDATIONS:   1. Clear liquid diet, monitor tolerance and ability to advance  2. Assess nutritional adequacy as diet advances and need for ONS    NUTRITION ASSESSMENT:  Positive screen  for N/V and poor po. Patient admitted with SBO, NGT placed to [facility] and now removed. Pt reported improving abdominal pain and clears to start today. Will monitor tolerance, ability to advance diet and need for ONS. MALNUTRITION ASSESSMENT  Context of Malnutrition: Acute Illness   Malnutrition Status: At risk for malnutrition (Comment)  Findings of the 6 clinical characteristics of malnutrition (Minimum of 2 out of 6 clinical characteristics is required to make the diagnosis of moderate or severe Protein Calorie Malnutrition based on AND/ASPEN Guidelines):  Energy Intake: Less than/equal to 50% of estimated energy requirements    Energy Intake Time: x2 days   Weight Loss %: No significant loss   Weight loss Time: Greater than or equal to 3 months   Body Fat Loss: Unable to Assess   Body Fat Location: Unable to assess    Body Muscle Loss: Unable to Assess   Body Muscle Loss Location: Unable to assess    Fluid Accumulation: No significant    Fluid Accumulation Location: No significant     Strength: Not Performed;  Not Measured     NUTRITION DIAGNOSIS   Problem: Problem #1: Inadequate oral intake  Etiology: Acute or chronic injury or trauma Altered GI function  Signs & Symptoms: Diet history of poor intake     NUTRITION INTERVENTION  Food and/or Nutrient Delivery:Continue Current diet   Nutrition education/counseling/coordination of care: Continue Inpatient Monitoring     NUTRITION MONITORING & EVALUATION:  Evaluation:Goals set   Goals:Goals: Patient will tolerate po diet and consume 50% or greater of all meals  Monitoring: Diet advancement  or Diet Tolerance      OBJECTIVE DATA:

## 2021-01-29 NOTE — PROGRESS NOTES
Assessment: Pt reported a concerning hx of recent falls, but had difficulty clarifying timeline/POC she received after visiting urgent care/ED for these falls. She appears to be functioning near her baseline, which is unsteady for standing ADLs and t/fs. Pt will benefit from cont. follow from OT to reinforce safety techniques during ADLs and t/fs. Pt will require initial 24 hr A/S upon DC 2/2 poor balance & fall risk. Recommend cont. OT services to maximize safety & functional independence. Cont. per POC. Treatment Diagnosis: Impaired ADLs & t/fs  Prognosis: Good  Decision Making: Medium Complexity  OT Education: OT Role;Plan of Care;Transfer Training;Energy Conservation  Patient Education: pt verb understanding & receptive to recommendations, likely needs reinforcement  REQUIRES OT FOLLOW UP: Yes  Activity Tolerance  Activity Tolerance: Patient Tolerated treatment well  Activity Tolerance: No sign or symptom of dizziness, pain or fatigue  Safety Devices  Safety Devices in place: Yes  Type of devices: Call light within reach; Chair alarm in place; Left in chair;Nurse notified           Patient Diagnosis(es): The encounter diagnosis was Small bowel obstruction ([facility] [address] has a past medical history of Chronic pain and Hypertension. has a past surgical history that includes Hysterectomy; Appendectomy; and Cholecystectomy.     Treatment Diagnosis: Impaired ADLs & t/fs      Restrictions  Restrictions/Precautions  Restrictions/Precautions: Up as Tolerated(NG tube removed prior to OT)  Position Activity Restriction  Other position/activity restrictions: up as nohemy prn    Subjective   General  Chart Reviewed: Yes  Patient assessed for rehabilitation services?: Yes  Additional Pertinent Hx: Hx = HTN, HLD,  remote cholecystectomy, appendectomy, and hysterectomy  Response to previous treatment: Patient with no complaints from previous session  Family / Caregiver Present: No  Referring Practitioner: [employer] Diagnosis: 1/27 admit to ED for 2 weeks of abdominal pain, constipation & decr. output. CT abd/pelvis revealed bowel obstruction, general surgery following. NG tube placed (d/c 1/29). Subjective  Subjective: Pt in bed upon arrival, pleasant and agreeable to therapy, but also providing some vague/confusing reponses to subjective interview. Quick to laughter when discussing PLOF/recent falls. \"I'll walk however far I gotta walk to be able to go home. \"  Patient Currently in Pain: Denies at rest; c/o R ankle pain but did not rate (RN informed)   Social/Functional History  Social/Functional History  Lives With: Boyfriend & son (both work; reports boyfriend works maintenance 24 hr in the apartment they live in, and has asked her not to get up when he is not home)  Type of Home: Apartment  Home Access: Stairs to enter with rails  Entrance Stairs - Number of Steps: 7+18  Bathroom Shower/Tub: Tub/Shower unit  Bathroom Toilet: Standard  Bathroom Equipment: Shower chair(reports she owns shower chair but did not use pta)  Bathroom Accessibility: Accessible  Home Equipment: Rolling walker  Receives Help From: Family(reports she was getting local therapy (outpatient ?) before september, vague details)  ADL Assistance: Needs assistance(has SUP during showers)  Ambulation Assistance: IND (boyfriend supervises ambulation when he is home (no one can supervise when she is home alone)) (reports using RW as of last month 2/2 falls)  Transfer Assistance: IND (boyfriend supervises shower t/fs when able)  Active : No  Type of occupation: \"I don't work, I just be at home. I used to take care of my granddaughter, but she's grown now (23). \"  Note: Pt reporting boyiend works in maintenance in apartment building and is planning to install grab bars in bathroom. Additional Comments: reports multiple falls, lucia. when getting up OOB at night or out of the shower, and \"broke this\" (pointing to ankle & toes of R foot), req. visit to urgent care and they provided brace & crutches (Dec. 2020). Reports she is not able to make it to bathroom and becomes incontinent (plans on getting Depends). Pt is questionable historian, unsure of timeline of events/falls and providing vague details. Objective   Vision: Impaired  Vision Exceptions: Wears glasses at all times(far-sighted)  Hearing: Within functional limits    Orientation  Overall Orientation Status: Within Functional Limits(oriented to place & time)     Balance  Sitting Balance: Stand by assistance(SBA for dynamic reach w/out support EOB)  Standing Balance: Contact guard assistance(CGA for dynamic tasks; able to achieve SBA for static tasks end of session)  Standing Balance  Time: up to 2 min, multiple bouts  Activity: hallway ambulation, toilet t/f  Functional Mobility  Functional - Mobility Device: Rolling Walker  Activity: To/from bathroom(group home down dent and back)  Assist Level: Stand by assistance(initially CGA; progressed to SBA w/ increased practice and education on form/tempo)  Functional Mobility Comments: observed dyskinetic movements, lateral sway during mobility  Toilet Transfers  Toilet - Technique: Ambulating  Equipment Used: Standard toilet(grab bar R side)  Toilet Transfer: Contact guard assistance  Toilet Transfers Comments: req.  VCs to keep RW close when sitting  ADL  Feeding: Setup  LE Dressing: Contact guard assistance(able to don/doff socks & boots EOB; able to pull pants up/down w/ CGA for balanace)  Toileting: Contact guard assistance(toilet t/f only)  Coordination  Movements Are Fluid And Coordinated: No (gross motor, during ambulation)      Bed mobility  Supine to Sit: Supervision(HOB elevated; no use of rail)  Transfers  Sit to stand: Stand by assistance  Stand to sit: Stand by assistance AM-PAC Inpatient Daily Activity Raw Score: 20 (01/29/21 1023)  AM-PAC Inpatient ADL T-Scale Score : 42.03 (01/29/21 1023)  ADL Inpatient CMS 0-100% Score: 38.32 (01/29/21 1023)  ADL Inpatient CMS G-Code Modifier : CJ (01/29/21 1023)    Goals  Short term goals  Time Frame for Short term goals: discharge  Short term goal 1: Pt will don/doff brief at SUP level, no more than 1 VC for safe hand placement on RW  Short term goal 2: Pt will complete toilet t/f at SUP level  Short term goal 3: Pt will complete dynamic grooming activity in stance, SUP  Patient Goals   Patient goals : Anything I can do to go home       Therapy Time   Individual Concurrent Group Co-treatment   Time In 0911         Time Out 1004         Minutes 53              Timed Code Treatment Minutes:  38     Total Treatment Minutes:   [address], s/OT  Celso Sandoval OTR/L #[id number]  Therapist was present, directed the patient's care, made skilled judgement, and was responsible for assessment and treatment of the patient.

## 2021-01-29 NOTE — PLAN OF CARE
Nutrition Problem #1: Inadequate oral intake  Intervention: Food and/or Nutrient Delivery: Continue Current Diet  Nutritional Goals: Patient will tolerate po diet and consume 50% or greater of all meals

## 2021-01-29 NOTE — PROGRESS NOTES
Physical Therapy    Facility/Department: [facility]  Initial Assessment/Treatment    NAME: Maribel Emmanuel  : 1960  MRN: [id number]    Date of Service: 2021    Discharge Recommendations: Maribel Emmanuel scored a 18/24 on the AM-PAC short mobility form. Current research shows that an AM-PAC score of 18 or greater is typically associated with a discharge to the patient's home setting. Based on the patient's AM-PAC score and their current functional mobility deficits, it is recommended that the patient have 2-3 sessions per week of Physical Therapy at d/c to increase the patient's independence. At this time, this patient demonstrates the endurance and safety to discharge home with ** (home vs OP services) and a follow up treatment frequency of 2-3x/wk. Please see assessment section for further patient specific details. If patient discharges prior to next session this note will serve as a discharge summary. Please see below for the latest assessment towards goals. HOME HEALTH CARE: LEVEL 1 STANDARD    - Initial home health evaluation to occur within 24-48 hours, in patient home   - Therapy to evaluate with goal of regaining prior level of functioning   - Therapy to evaluate if patient has [address] needs for personal care      PT Equipment Recommendations  Equipment Needed: No    Assessment   Body structures, Functions, Activity limitations: Decreased functional mobility ; Decreased ROM; Decreased cognition;Decreased balance;Decreased endurance Additional Comments: reports multiple falls, lucia. when getting up OOB at night or out of the shower, and \"broke this\" (pointing to ankle & toes of R foot), req. visit to urgent care and they provided brace & crutches (Dec. 2020). Reports she is not able to make it to bathroom and becomes incontinent (plans on getting Depends). Pt is questionable historian, unsure of timeline of events/falls and providing vague details. Objective          AROM RLE (degrees)  RLE General AROM: limited df R foot  AROM LLE (degrees)  LLE AROM : WFL  Strength RLE  Comment: foot NT due to soreness; indep witih LAQ  Strength LLE  Strength LLE: WFL        Bed mobility  Supine to Sit: Supervision(HOB elevated; no use of rail)  Transfers  Sit to Stand: Contact guard assistance;Stand by assistance(CGA initially, progressed to SBA)  Stand to sit: Contact guard assistance;Stand by assistance  Ambulation  Ambulation?: Yes  Ambulation 1  Surface: level tile  Device: Rolling Walker  Assistance: Contact guard assistance;Stand by assistance(CGA initially walks; progressed to sBA)  Quality of Gait: slight limp; slow but steady gait  Gait Deviations: Decreased step length;Decreased step height  Distance: 20' x 2 (CGA); 50' (SBA)  Comments: Pt very unclear regarding R ankle & what is wrong with it. Pt can't recall when she was at urgent care last but doesn't think she has a WB restiction. Isn't sure if supposed to wear a brace. Did have pt wear her boots for ambulation to provide increased support. Stairs/Curb  Stairs?: No(declined trying today stating boyfriend always assists on steps & doesn't feel she would have any trouble)     Balance  Sitting - Static: Good(indep on side of bed)  Standing - Static: (SB/cGA with walker)    Treatment included gait/transfer training, pt education.       Plan   Plan  Times per week: 2-5 Current Treatment Recommendations: Balance Training, Functional Mobility Training, Transfer Training, Gait Training, Stair training, Safety Education & Training, Patient/Caregiver Education & Training  Safety Devices  Type of devices: Call light within reach, Chair alarm in place, Left in chair, Nurse notified                                                    AM-PAC Score  AM-PAC Inpatient Mobility Raw Score : 18 (01/29/21 1017)  AM-PAC Inpatient T-Scale Score : 43.63 (01/29/21 1017)  Mobility Inpatient CMS 0-100% Score: 46.58 (01/29/21 1017)  Mobility Inpatient CMS G-Code Modifier : CK (01/29/21 1017)          Goals  Short term goals  Time Frame for Short term goals: dc  Short term goal 1: Transfers S  Short term goal 2: Ambulate 76' with RW S  Short term goal 3: up/down flight of steps with rails CGA  Patient Goals   Patient goals : plans for home at dc       Therapy Time   Individual Concurrent Group Co-treatment   Time In 0913         Time Out 1006         Minutes 53           Timed Code Treatment Minutes:   40    Total Treatment Minutes:  [address], [address]

## 2021-01-29 NOTE — PROGRESS NOTES
Patient Aox4 and able to make needs known; compliant with all medications as ordered; VSS: RRR: IVF as ordered; NG d/c'd as of note, no complaints of n/v/sob/cp; tolerating CLD; bowel sounds audible 4Q; bed in lowest position; call light within reach; bed/chair alarm engaged; will continue to monitor.      Electronically signed by Flora Maldonado RN on 1/29/2021 at 10:57 AM

## 2021-01-29 NOTE — PROGRESS NOTES
Patient alert and oriented. VSS Patient denies any pain or nausea. NG tube in place. . Patient in bed lowest position call light and bedside table within reach. All needs are met at this time. Patient aware to call if any help needed. Will continue to monitor  /74   Pulse 96   Temp 99 °F (37.2 °C) (Oral)   Resp 18   Ht 5' 2\" (1.575 m)   Wt 198 lb 8 oz (90 kg)   SpO2 91%   BMI 36.31 kg/m²

## 2021-01-29 NOTE — PLAN OF CARE
Problem: Falls - Risk of:  Goal: Will remain free from falls  Description: Will remain free from falls  1/29/2021 1040 by Bill Santiago RN  Outcome: Ongoing  Note: Call light within reach; bed/chair alarm engaged   1/29/2021 0812 by Maged Michele RN  Outcome: Ongoing  Note: Patient remains free from physical injury. Fall precautions in place: Patient in bed lowest position,wheels locked. 2/4 side rails up Call light and beside table within reach.  Will continue to monitor       Problem: Daily Care:  Goal: Daily care needs are met  Description: Daily care needs are met  Outcome: Ongoing  Note: Prema care completed; NG maintenance

## 2021-01-30 LAB
ALBUMIN SERPL-MCNC: 3.7 G/DL (ref 3.4–5)
ANION GAP SERPL CALCULATED.3IONS-SCNC: 8 MMOL/L (ref 3–16)
BASOPHILS ABSOLUTE: 0 K/UL (ref 0–0.2)
BASOPHILS RELATIVE PERCENT: 0.4 %
BUN BLDV-MCNC: 6 MG/DL (ref 7–20)
CALCIUM SERPL-MCNC: 10 MG/DL (ref 8.3–10.6)
CHLORIDE BLD-SCNC: 102 MMOL/L (ref 99–110)
CO2: 28 MMOL/L (ref 21–32)
CREAT SERPL-MCNC: 0.6 MG/DL (ref 0.6–1.2)
EOSINOPHILS ABSOLUTE: 0.1 K/UL (ref 0–0.6)
EOSINOPHILS RELATIVE PERCENT: 1.8 %
GFR AFRICAN AMERICAN: >60
GFR NON-AFRICAN AMERICAN: >60
GLUCOSE BLD-MCNC: 100 MG/DL (ref 70–99)
GLUCOSE BLD-MCNC: 123 MG/DL (ref 70–99)
GLUCOSE BLD-MCNC: 93 MG/DL (ref 70–99)
GLUCOSE BLD-MCNC: 97 MG/DL (ref 70–99)
GLUCOSE BLD-MCNC: 97 MG/DL (ref 70–99)
HCT VFR BLD CALC: 37.3 % (ref 36–48)
HEMOGLOBIN: 12.6 G/DL (ref 12–16)
LYMPHOCYTES ABSOLUTE: 2 K/UL (ref 1–5.1)
LYMPHOCYTES RELATIVE PERCENT: 30.3 %
MAGNESIUM: 1.7 MG/DL (ref 1.8–2.4)
MCH RBC QN AUTO: 32 PG (ref 26–34)
MCHC RBC AUTO-ENTMCNC: 33.8 G/DL (ref 31–36)
MCV RBC AUTO: 94.7 FL (ref 80–100)
MONOCYTES ABSOLUTE: 0.7 K/UL (ref 0–1.3)
MONOCYTES RELATIVE PERCENT: 10.1 %
NEUTROPHILS ABSOLUTE: 3.8 K/UL (ref 1.7–7.7)
NEUTROPHILS RELATIVE PERCENT: 57.4 %
PDW BLD-RTO: 13 % (ref 12.4–15.4)
PERFORMED ON: ABNORMAL
PERFORMED ON: ABNORMAL
PERFORMED ON: NORMAL
PERFORMED ON: NORMAL
PHOSPHORUS: 2.7 MG/DL (ref 2.5–4.9)
PLATELET # BLD: 188 K/UL (ref 135–450)
PMV BLD AUTO: 9.1 FL (ref 5–10.5)
POTASSIUM SERPL-SCNC: 3.7 MMOL/L (ref 3.5–5.1)
RBC # BLD: 3.94 M/UL (ref 4–5.2)
SODIUM BLD-SCNC: 138 MMOL/L (ref 136–145)
WBC # BLD: 6.6 K/UL (ref 4–11)

## 2021-01-30 PROCEDURE — 6360000002 HC RX W HCPCS: Performed by: NURSE PRACTITIONER

## 2021-01-30 PROCEDURE — C9113 INJ PANTOPRAZOLE SODIUM, VIA: HCPCS | Performed by: STUDENT IN AN ORGANIZED HEALTH CARE EDUCATION/TRAINING PROGRAM

## 2021-01-30 PROCEDURE — 6370000000 HC RX 637 (ALT 250 FOR IP): Performed by: STUDENT IN AN ORGANIZED HEALTH CARE EDUCATION/TRAINING PROGRAM

## 2021-01-30 PROCEDURE — 80069 RENAL FUNCTION PANEL: CPT

## 2021-01-30 PROCEDURE — 85025 COMPLETE CBC W/AUTO DIFF WBC: CPT

## 2021-01-30 PROCEDURE — 2500000003 HC RX 250 WO HCPCS: Performed by: NURSE PRACTITIONER

## 2021-01-30 PROCEDURE — 6360000002 HC RX W HCPCS: Performed by: STUDENT IN AN ORGANIZED HEALTH CARE EDUCATION/TRAINING PROGRAM

## 2021-01-30 PROCEDURE — 2580000003 HC RX 258: Performed by: NURSE PRACTITIONER

## 2021-01-30 PROCEDURE — 36415 COLL VENOUS BLD VENIPUNCTURE: CPT

## 2021-01-30 PROCEDURE — 1200000000 HC SEMI PRIVATE

## 2021-01-30 PROCEDURE — 2580000003 HC RX 258: Performed by: STUDENT IN AN ORGANIZED HEALTH CARE EDUCATION/TRAINING PROGRAM

## 2021-01-30 PROCEDURE — 83735 ASSAY OF MAGNESIUM: CPT

## 2021-01-30 PROCEDURE — 6370000000 HC RX 637 (ALT 250 FOR IP): Performed by: NURSE PRACTITIONER

## 2021-01-30 PROCEDURE — 99232 SBSQ HOSP IP/OBS MODERATE 35: CPT | Performed by: SURGERY

## 2021-01-30 RX ORDER — ONDANSETRON 4 MG/1
4 TABLET, ORALLY DISINTEGRATING ORAL EVERY 8 HOURS PRN
Qty: 20 TABLET | Refills: 0 | Status: SHIPPED | OUTPATIENT
Start: 2021-01-30

## 2021-01-30 RX ORDER — DOCUSATE SODIUM 100 MG/1
100 CAPSULE, LIQUID FILLED ORAL 2 TIMES DAILY
Status: DISCONTINUED | OUTPATIENT
Start: 2021-01-30 | End: 2021-01-31 | Stop reason: HOSPADM

## 2021-01-30 RX ORDER — HYDROCORTISONE 0.5 %
CREAM (GRAM) TOPICAL 3 TIMES DAILY PRN
Status: DISCONTINUED | OUTPATIENT
Start: 2021-01-30 | End: 2021-01-31 | Stop reason: HOSPADM

## 2021-01-30 RX ORDER — POTASSIUM CHLORIDE 7.45 MG/ML
10 INJECTION INTRAVENOUS
Status: COMPLETED | OUTPATIENT
Start: 2021-01-30 | End: 2021-01-30

## 2021-01-30 RX ORDER — AMLODIPINE BESYLATE 5 MG/1
5 TABLET ORAL DAILY
Status: DISCONTINUED | OUTPATIENT
Start: 2021-01-30 | End: 2021-01-31 | Stop reason: HOSPADM

## 2021-01-30 RX ORDER — LANOLIN ALCOHOL/MO/W.PET/CERES
400 CREAM (GRAM) TOPICAL ONCE
Status: COMPLETED | OUTPATIENT
Start: 2021-01-30 | End: 2021-01-30

## 2021-01-30 RX ADMIN — POTASSIUM CHLORIDE 10 MEQ: 10 INJECTION, SOLUTION INTRAVENOUS at 13:30

## 2021-01-30 RX ADMIN — POTASSIUM CHLORIDE 10 MEQ: 10 INJECTION, SOLUTION INTRAVENOUS at 11:58

## 2021-01-30 RX ADMIN — POTASSIUM CHLORIDE 10 MEQ: 10 INJECTION, SOLUTION INTRAVENOUS at 15:07

## 2021-01-30 RX ADMIN — SODIUM PHOSPHATE, MONOBASIC, MONOHYDRATE AND SODIUM PHOSPHATE, DIBASIC, ANHYDROUS 10 MMOL: 276; 142 INJECTION, SOLUTION INTRAVENOUS at 12:00

## 2021-01-30 RX ADMIN — DOCUSATE SODIUM 100 MG: 100 CAPSULE, LIQUID FILLED ORAL at 08:03

## 2021-01-30 RX ADMIN — AMLODIPINE BESYLATE 5 MG: 5 TABLET ORAL at 17:31

## 2021-01-30 RX ADMIN — Medication 10 ML: at 21:33

## 2021-01-30 RX ADMIN — MENTHOL, METHYL SALICYLATE: 10; 15 CREAM TOPICAL at 17:41

## 2021-01-30 RX ADMIN — Medication 10 ML: at 08:02

## 2021-01-30 RX ADMIN — ENOXAPARIN SODIUM 40 MG: 40 INJECTION SUBCUTANEOUS at 08:02

## 2021-01-30 RX ADMIN — Medication 400 MG: at 10:09

## 2021-01-30 RX ADMIN — POTASSIUM CHLORIDE 10 MEQ: 10 INJECTION, SOLUTION INTRAVENOUS at 10:09

## 2021-01-30 RX ADMIN — DOCUSATE SODIUM 100 MG: 100 CAPSULE, LIQUID FILLED ORAL at 21:32

## 2021-01-30 RX ADMIN — PANTOPRAZOLE SODIUM 40 MG: 40 INJECTION, POWDER, FOR SOLUTION INTRAVENOUS at 08:02

## 2021-01-30 ASSESSMENT — PAIN SCALES - GENERAL
PAINLEVEL_OUTOF10: 0
PAINLEVEL_OUTOF10: 0

## 2021-01-30 NOTE — PLAN OF CARE
Problem: Falls - Risk of:  Goal: Will remain free from falls  Description: Will remain free from falls  1/29/2021 2117 by Phi Lopez RN  Outcome: Ongoing   Pt has non skid socks on, call light within reach, bed in lowest position with wheels locked, 2/4 side rails up, and bed alarm on.

## 2021-01-30 NOTE — PLAN OF CARE
Problem: Falls - Risk of:  Goal: Will remain free from falls  Description: Will remain free from falls  1/30/2021 0910 by Daniel Rojas  Outcome: Ongoing  Note: Patient in bed with bed alarm on. Call light within reach. Patient calls out appropriately. Will continue to lay eyes on patient. Problem: Bowel/Gastric:  Goal: Control of bowel function will improve  Description: Control of bowel function will improve  Outcome: Ongoing  Note: No reports of nausea or vomiting at this time. Active bowel sounds auscultated throughout abdomen. Patient tolerating general diet.  Will continue to monitor for any changes

## 2021-01-30 NOTE — PROGRESS NOTES
Patient A&O, VSS. Patient tolerating general diet. No reports of nausea or vomiting at this time. Active bowel sounds auscultated throughout abdomen. Denies pain at this time. Patient saline locked per orders. Patient denies any further needs at this time. Bed is in the lowest position, bed alarm on, patient call light and bedside table are within reach. Will continue to monitor for changes in patient status.     BP (!) 141/90   Pulse 78   Temp 98.3 °F (36.8 °C) (Oral)   Resp 16   Ht 5' 2\" (1.575 m)   Wt 198 lb 8 oz (90 kg)   SpO2 95%   BMI 36.31 kg/m²     Electronically signed by Pantera Lee on 1/30/2021 at 9:14 AM

## 2021-01-30 NOTE — PROGRESS NOTES
Surgery Daily Progress Note  [facility]:  SBO  Subjective :  Pt rested well overnight. HDS. Had a BM/ flatus. Tolerating CLD without n/v.  Endorses appetite. No c/o pain. Objective    Infusions:   lactated ringers 125 mL/hr at 01/29/21 0821    dextrose          I/O:I/O last 3 completed shifts: In: 1223.3 [P.O.:460; I.V.:763.3]  Out: 501 [Urine:501]           Wt Readings from Last 1 Encounters:   01/28/21 198 lb 8 oz (90 kg)                 LABS:    Recent Labs     01/29/21  0512 01/30/21  0544   WBC 7.6 6.6   HGB 12.8 12.6   HCT 37.6 37.3   MCV 94.4 94.7    188        Recent Labs     01/28/21  0612 01/29/21  0512    141   K 3.1* 3.5    102   CO2 25 28   PHOS 3.9 3.1   BUN 11 9   CREATININE 0.8 0.6        Recent Labs     01/27/21 2037   AST 16   ALT 17   BILIDIR <0.2   BILITOT 0.4   ALKPHOS 99        Recent Labs     01/27/21 2037   LIPASE 9.0*        Recent Labs     01/27/21 2037 01/28/21  1108   PROT 8.0  --    INR  --  1.14      No results for input(s): CKTOTAL, CKMB, CKMBINDEX, TROPONINI in the last 72 hours. Exam:BP (!) 158/88   Pulse 82   Temp 98.3 °F (36.8 °C) (Oral)   Resp 16   Ht 5' 2\" (1.575 m)   Wt 198 lb 8 oz (90 kg)   SpO2 97%   BMI 36.31 kg/m²   General appearance: alert, appears stated age and cooperative  Lungs: clear to auscultation bilaterally  Heart: regular rate and rhythm, S1, S2   Abdomen: soft, non-tender; bowel sounds present      ASSESSMENT/PLAN: Pt. is a 61 y.o. female with SBO. Pt with return of bowel function with BM and flatus.      - KUB yesterday with contrast progressed to colon and rectum, no residual contrast seen in small bowel   - SLIV  - regular diet  - restart home meds; norvasc, colace  - hydralazine PRN  - strict I/Os  - OOB, ambulate, IS   - PT/OT ordered; 18/24, 20/24  -anticipate home today     Raghu Tirado 1/30/2021 6:14 AM  [phone] I have personally examined this patient, reviewed all pertinent labs and radiologic imaging, and agree with the Advanced Practice Provider note.     XRay reviewed with progression of contrast  Advance diet    DC home tomorrow likely     [place] Lisa

## 2021-01-31 VITALS
DIASTOLIC BLOOD PRESSURE: 78 MMHG | OXYGEN SATURATION: 95 % | HEIGHT: 62 IN | BODY MASS INDEX: 36.53 KG/M2 | TEMPERATURE: 98.3 F | HEART RATE: 90 BPM | WEIGHT: 198.5 LBS | SYSTOLIC BLOOD PRESSURE: 135 MMHG | RESPIRATION RATE: 15 BRPM

## 2021-01-31 LAB
ALBUMIN SERPL-MCNC: 3.7 G/DL (ref 3.4–5)
ANION GAP SERPL CALCULATED.3IONS-SCNC: 10 MMOL/L (ref 3–16)
BASOPHILS ABSOLUTE: 0 K/UL (ref 0–0.2)
BASOPHILS RELATIVE PERCENT: 0.3 %
BUN BLDV-MCNC: 6 MG/DL (ref 7–20)
CALCIUM SERPL-MCNC: 10.3 MG/DL (ref 8.3–10.6)
CHLORIDE BLD-SCNC: 101 MMOL/L (ref 99–110)
CO2: 28 MMOL/L (ref 21–32)
CREAT SERPL-MCNC: 0.6 MG/DL (ref 0.6–1.2)
EOSINOPHILS ABSOLUTE: 0.1 K/UL (ref 0–0.6)
EOSINOPHILS RELATIVE PERCENT: 1.8 %
GFR AFRICAN AMERICAN: >60
GFR NON-AFRICAN AMERICAN: >60
GLUCOSE BLD-MCNC: 101 MG/DL (ref 70–99)
GLUCOSE BLD-MCNC: 89 MG/DL (ref 70–99)
GLUCOSE BLD-MCNC: 96 MG/DL (ref 70–99)
HCT VFR BLD CALC: 37 % (ref 36–48)
HEMOGLOBIN: 12.5 G/DL (ref 12–16)
LYMPHOCYTES ABSOLUTE: 2.2 K/UL (ref 1–5.1)
LYMPHOCYTES RELATIVE PERCENT: 33.9 %
MAGNESIUM: 1.5 MG/DL (ref 1.8–2.4)
MCH RBC QN AUTO: 31.8 PG (ref 26–34)
MCHC RBC AUTO-ENTMCNC: 33.9 G/DL (ref 31–36)
MCV RBC AUTO: 93.8 FL (ref 80–100)
MONOCYTES ABSOLUTE: 0.7 K/UL (ref 0–1.3)
MONOCYTES RELATIVE PERCENT: 10.8 %
NEUTROPHILS ABSOLUTE: 3.4 K/UL (ref 1.7–7.7)
NEUTROPHILS RELATIVE PERCENT: 53.2 %
PDW BLD-RTO: 12.7 % (ref 12.4–15.4)
PERFORMED ON: ABNORMAL
PERFORMED ON: NORMAL
PHOSPHORUS: 3.5 MG/DL (ref 2.5–4.9)
PLATELET # BLD: 179 K/UL (ref 135–450)
PMV BLD AUTO: 9.3 FL (ref 5–10.5)
POTASSIUM SERPL-SCNC: 3.8 MMOL/L (ref 3.5–5.1)
RBC # BLD: 3.94 M/UL (ref 4–5.2)
SODIUM BLD-SCNC: 139 MMOL/L (ref 136–145)
WBC # BLD: 6.4 K/UL (ref 4–11)

## 2021-01-31 PROCEDURE — 36415 COLL VENOUS BLD VENIPUNCTURE: CPT

## 2021-01-31 PROCEDURE — 99231 SBSQ HOSP IP/OBS SF/LOW 25: CPT | Performed by: SURGERY

## 2021-01-31 PROCEDURE — 6370000000 HC RX 637 (ALT 250 FOR IP): Performed by: STUDENT IN AN ORGANIZED HEALTH CARE EDUCATION/TRAINING PROGRAM

## 2021-01-31 PROCEDURE — 83735 ASSAY OF MAGNESIUM: CPT

## 2021-01-31 PROCEDURE — 2580000003 HC RX 258: Performed by: STUDENT IN AN ORGANIZED HEALTH CARE EDUCATION/TRAINING PROGRAM

## 2021-01-31 PROCEDURE — 6360000002 HC RX W HCPCS: Performed by: STUDENT IN AN ORGANIZED HEALTH CARE EDUCATION/TRAINING PROGRAM

## 2021-01-31 PROCEDURE — 6370000000 HC RX 637 (ALT 250 FOR IP): Performed by: NURSE PRACTITIONER

## 2021-01-31 PROCEDURE — 85025 COMPLETE CBC W/AUTO DIFF WBC: CPT

## 2021-01-31 PROCEDURE — 80069 RENAL FUNCTION PANEL: CPT

## 2021-01-31 RX ORDER — MAGNESIUM SULFATE IN WATER 40 MG/ML
4000 INJECTION, SOLUTION INTRAVENOUS ONCE
Status: COMPLETED | OUTPATIENT
Start: 2021-01-31 | End: 2021-01-31

## 2021-01-31 RX ORDER — PANTOPRAZOLE SODIUM 40 MG/1
40 TABLET, DELAYED RELEASE ORAL
Qty: 30 TABLET | Refills: 3 | Status: SHIPPED | OUTPATIENT
Start: 2021-02-01

## 2021-01-31 RX ORDER — PANTOPRAZOLE SODIUM 40 MG/1
40 TABLET, DELAYED RELEASE ORAL
Qty: 30 TABLET | Refills: 3
Start: 2021-02-01 | End: 2021-01-31

## 2021-01-31 RX ORDER — POTASSIUM CHLORIDE 20 MEQ/1
20 TABLET, EXTENDED RELEASE ORAL ONCE
Status: COMPLETED | OUTPATIENT
Start: 2021-01-31 | End: 2021-01-31

## 2021-01-31 RX ORDER — PANTOPRAZOLE SODIUM 40 MG/1
40 TABLET, DELAYED RELEASE ORAL
Status: DISCONTINUED | OUTPATIENT
Start: 2021-01-31 | End: 2021-01-31 | Stop reason: HOSPADM

## 2021-01-31 RX ADMIN — POTASSIUM CHLORIDE 20 MEQ: 1500 TABLET, EXTENDED RELEASE ORAL at 09:23

## 2021-01-31 RX ADMIN — DOCUSATE SODIUM 100 MG: 100 CAPSULE, LIQUID FILLED ORAL at 08:03

## 2021-01-31 RX ADMIN — Medication 10 ML: at 08:04

## 2021-01-31 RX ADMIN — ENOXAPARIN SODIUM 40 MG: 40 INJECTION SUBCUTANEOUS at 08:03

## 2021-01-31 RX ADMIN — MAGNESIUM SULFATE HEPTAHYDRATE 4000 MG: 4 INJECTION, SOLUTION INTRAVENOUS at 09:23

## 2021-01-31 RX ADMIN — PANTOPRAZOLE SODIUM 40 MG: 40 TABLET, DELAYED RELEASE ORAL at 06:26

## 2021-01-31 RX ADMIN — AMLODIPINE BESYLATE 5 MG: 5 TABLET ORAL at 08:03

## 2021-01-31 ASSESSMENT — PAIN SCALES - GENERAL
PAINLEVEL_OUTOF10: 0
PAINLEVEL_OUTOF10: 0

## 2021-01-31 NOTE — PROGRESS NOTES
Bariatric Surgery   Daily Progress Note  Patient: Juliet Hawkins    CC: SBO    SUBJECTIVE:  HDS and afebrile. Denies abdominal pain. Tolerating diet without nausea and vomiting. Having bowel movements and passing flatus. ROS: A 14 point review of systems was conducted, significant findings as noted above. OBJECTIVE:   Infusions:   dextrose        I/O:I/O last 3 completed shifts: In: 1000 [P.O.:540; I.V.:460]  Out: 2150 [Urine:2150]           Wt Readings from Last 1 Encounters:   01/28/21 198 lb 8 oz (90 kg)       Exam:  BP (!) 146/91   Pulse 69   Temp 98.6 °F (37 °C) (Oral)   Resp 14   Ht 5' 2\" (1.575 m)   Wt 198 lb 8 oz (90 kg)   SpO2 96%   BMI 36.31 kg/m²      General appearance: alert, appears stated age and cooperative  Lungs: clear to auscultation bilaterally  Heart: regular rate and rhythm, S1, S2   Abdomen: soft, non-tender; bowel sounds present              LABS:   Recent Labs     01/30/21  0544 01/31/21  0503   WBC 6.6 6.4   HGB 12.6 12.5   HCT 37.3 37.0   MCV 94.7 93.8    179        Recent Labs     01/30/21  0544 01/31/21  0503    139   K 3.7 3.8    101   CO2 28 28   PHOS 2.7 3.5   BUN 6* 6*   CREATININE 0.6 0.6      No results for input(s): AST, ALT, ALB, BILIDIR, BILITOT, ALKPHOS in the last 72 hours. No results for input(s): LIPASE, AMYLASE in the last 72 hours. Recent Labs     01/28/21  1108   INR 1.14      No results for input(s): CKTOTAL, CKMB, CKMBINDEX, TROPONINI in the last 72 hours. ASSESSMENT/PLAN:   Patient is a 61 y.o. female with a history of multiple prior abdominal surgeries who presented to our service with abdominal pain. CT exam on 1/27 showed small bowel dilatation with transition point concerning for SBO. Pt with return of bowel function with BM and flatus. - Patient tolerating regular diet for three meals.   - Patient with multiple bowel movements. Denies abdominal pain. - Patient stable for discharge home.      Kaleigh Meyer MD General Surgery, PGY1  01/31/21  6:55 AM  [phone]

## 2021-01-31 NOTE — PROGRESS NOTES
Discharge Progress Note  1/31/2021 2:35 PM  Data:  Discharge order received. Action:  IV D/C'd without difficulty. See Doc Flowsheets for assessment. Patient given discharge instructions with prescriptions. Response:  Patient verbalized understanding of discharge instructions. Patient left with all belongings.     Jermaine Gonzales  ________________________________________________________________________

## 2021-01-31 NOTE — PROGRESS NOTES
Pt is alert and oriented, VSS. Denies nausea, tolerating general diet. Denies abdominal pain. Continues to c/o neck pain, PRN pain cream applied as needed per order. Urinating freely, no BM this shift. Lung sounds diminished, pt educated on IS use, demonstrated understanding. Up w walker and SBA. Pt now resting in bed w eyes closed, alarm on, call light within reach.      /86   Pulse 93   Temp 98.9 °F (37.2 °C) (Oral)   Resp 17   Ht 5' 2\" (1.575 m)   Wt 198 lb 8 oz (90 kg)   SpO2 94%   BMI 36.31 kg/m²

## 2021-01-31 NOTE — PLAN OF CARE
Problem: Falls - Risk of:  Goal: Will remain free from falls  Description: Will remain free from falls  Outcome: Ongoing  Note: Patient up in chair with chair alarm on. Call light within reach. Patient calls out appropriately. Will continue to lay eyes on patient. Problem: Bowel/Gastric:  Goal: Control of bowel function will improve  Description: Control of bowel function will improve  Outcome: Ongoing  Note: Bowel sounds present throughout abd quadrants. Patient tolerating general diet. No reports of nausea or vomiting. Will continue to monitor for any changes.

## 2021-01-31 NOTE — PROGRESS NOTES
Patient A&O, VSS. Tolerating general diet. Denies nausea. Bowel sounds present throughout abd quadrants. Denies pain. Patient up to chair with chair alarm on.   /79   Pulse 87   Temp 98.4 °F (36.9 °C) (Oral)   Resp 15   Ht 5' 2\" (1.575 m)   Wt 198 lb 8 oz (90 kg)   SpO2 97%   BMI 36.31 kg/m²     Patient denies any further needs at this time. Bed is in the lowest position, bed alarm on, patient call light and bedside table are within reach. Will continue to monitor for changes in patient status.     Electronically signed by Santos Petersen on 1/31/2021 at 9:45 AM

## 2021-02-01 NOTE — ADT AUTH CERT
Utilization Reviews       PA recommends IP by Bryan Lyles RN       Review Status Review Entered   In Primary 2021 15:06      Criteria Review   We recommend that the following pt's current hospitalization under Inpatient status  is APPROPRIATE  .         Name: Cholo Mora   : 1960   CSN: [id number]   INSURANCE: [facility]           Clinical summary        61 y.o. female admitted with SBO   - KUB this AM with contrast progressed to colon and rectum, no residual contrast seen in small bowel   - NG removed this AM, ok for CLD, if fails may still need diagnostic lap  - will monitor for continued resolution of pain and continued bowel function  - continue IV pain meds  - hydralazine PRN  - replaced potassium this AM  - strict I/Os  - OOB, ambulate, IS   - PT/OT ordered   Vitals                           Improved  Labs and Imaging       CT abd with small bowel obstruction with obstructive transition zone in the right lower quadrant, hemiabdomen/parasagittal to the umbilicus with decompressed distal ileum  MCG criteria applies   yes  Comments               This chart was reviewed at 2:55 PM 2021     Cedrick Heath MD   Physician [id number] [place] [address] Road : [phone]  _____________________________      Intestinal Obstruction - Care Day 2 (2021) by Bryan Lyles RN       Review Status Review Entered   Completed 2021 10:58      Criteria Review      Care Day: 2 Care Date: 2021 Level of Care:    Guideline Day 2    Level Of Care    (X) Floor    2021 10:58 AM EST by Lucia Tolbert      med surg    Clinical Status    (X) * Hypotension absent    2021 10:58 AM EST by Lucia Tolbert      129/74    (X) * Nausea and vomiting absent or improved    2021 10:58 AM EST by Lucia Tolbert      absent    (X) * Pain absent or managed    2021 10:58 AM EST by Lucia Tolbert      managed    ( ) * Abdominal exam stable or improved    Activity (X) Activity as tolerated    1/29/2021 10:58 AM EST by Precious Esquivel      up to BR    Interventions    (X) * NG tube absent    1/29/2021 10:58 AM EST by Precious Esquivel      - NG removed this AM,    * Milestone   Additional Notes   1/29  Med surg      SUBJECTIVE:   Patient rested well overnight. Remained afebrile, HDS. Had loose bowel movement after contrast yesterday. Reports some residual pain in her lower abdomen, but states overall it is much improved. Denies flatus overnight.  Has been OOB to bathroom to urinate.       BP: 129/74   Pulse: 96   Resp: 18   Temp: 99 °F (37.2 °C)       General appearance: sitting up in bed, in NAD   Neuro: A&Ox3, no focal deficits, sensation intact   HEENT: NG tube to LWS, gastric output   Chest/Lungs: normal effort, no accessory muscle use, on RA   Cardiovascular: RRR   Abdomen: soft, minimally-tender, distended, no guarding/rigidity   Extremities: no edema, no cyanosis      Scheduled Meds:·  potassium chloride, 10 mEq, Intravenous, Q1H   ·  sodium chloride flush, 10 mL, Intravenous, 2 times per day   ·  enoxaparin, 40 mg, Subcutaneous, Daily   ·  pantoprazole, 40 mg, Intravenous, Daily         Continuous Infusions:·  lactated ringers Last Rate: 125 mL/hr       ASSESSMENT & PLAN:    This is a 61 y.o. female with a diagnosis of SBO       - KUB this AM with contrast progressed to colon and rectum, no residual contrast seen in small bowel    - NG removed this AM, ok for CLD   - will monitor for continued resolution of pain and continued bowel function   - continue IV pain meds   - hydralazine PRN   - replaced potassium this AM   - strict I/Os   - OOB, ambulate, IS    - PT/OT ordered       WBC 7.6 K/uL      RBC 3.98 M/uL      Hemoglobin 12.8 g/dL      Hematocrit 37.6 %      MCV 94.4 fL      MCH 32.0 pg      MCHC 33.9 g/dL      RDW 12.8 %      Platelets 394 K/uL      MPV 9.4 fL      Neutrophils % 68.0 %      Lymphocytes % 22.6 %      Monocytes % 8.3 %      Eosinophils % 0.9 %   Basophils % 0.2 %      Neutrophils Absolute 5.2 K/uL      Lymphocytes Absolute 1.7 K/uL      Monocytes Absolute 0.6 K/uL      Eosinophils Absolute 0.1 K/uL      Basophils Absolute 0.0 K/uL       Sodium 141 mmol/L      Potassium 3.5 mmol/L      Chloride 102 mmol/L      CO2 28 mmol/L      Anion Gap 11     Glucose 77 mg/dL      BUN 9 mg/dL      CREATININE 0.6 mg/dL      GFR Non- >60     GFR African American >60     Calcium 10.0 mg/dL      Phosphorus 3.1 mg/dL      Albumin 3.8 g/dL

## 2021-02-03 NOTE — DISCHARGE SUMMARY
Physician Discharge Summary      Patient ID:  Maribel Emmanuel  [id number]  27 y.o.  1960     Admit date: 1/27/2021        Discharge date and time: 1/31/2021  2:35 PM     Admitting Physician: Saurabh Mcgee MD     Admission Diagnoses: Small bowel obstruction ([address] [K56.609]     PMH:       Diagnosis Date    Chronic pain     Hypertension         PSH:   Past Surgical History:   Procedure Laterality Date    APPENDECTOMY      CHOLECYSTECTOMY      HYSTERECTOMY           Allergies: Aspirin and Pcn [penicillins]     Admission Condition: good     Discharged Condition: good     Hospital Course:   Ry Dyer is a 27-year-old female with past medical history of hypertension as well as past surgical history of appendectomy, cholecystectomy, and hysterectomy who presented to the [facility] emergency department on 1/27/2021 with gradually worsening abdominal pain over the past 1 to 2 weeks. She also reported associated nausea and vomiting with decreased bowel function. CT imaging from the emergency department showed evidence of a small bowel obstruction, and she was admitted to our service for further management and care. NG tube was placed in the emergency department with return of gastric contents. The following day, her abdominal pain and nausea significantly improved without initial return of bowel function. She had gastrografin placed down her NG tube that day; follow up XR displayed contrast within the colon and rectum. Her NG tube was removed the next day following a small bowel movement, and she was trialed on a clear liquid diet, which she ultimately tolerated without nausea/vomiting. She was discharged home the following day, 1/30/21, once her pain had resolved and she was tolerating a regular diet.      Discharge Physical Exam:   General appearance: sitting up in bed, in NAD  Neuro: A&Ox3, no focal deficits, sensation intact  HEENT: Normocephalic/atraumatic, EOMI, MMM

## 2021-02-10 NOTE — ED PROVIDER NOTES
ED Attending Attestation Note     Date of evaluation: 1/27/2021    This patient was seen by the advance practice provider. I have seen and examined the patient, agree with the workup, evaluation, management and diagnosis. The care plan has been discussed. My assessment reveals a 63 y/o F with nausea, vomiting and pain in her R upper quadrant. On examination she has diffuse abdominal tenderness without rebound or guarding. Yasmin Rodriguez MD  02/10/21 [id number]

## 2021-06-22 ENCOUNTER — APPOINTMENT (OUTPATIENT)
Dept: CT IMAGING | Age: 61
End: 2021-06-22
Payer: COMMERCIAL

## 2021-06-22 ENCOUNTER — HOSPITAL ENCOUNTER (EMERGENCY)
Age: 61
Discharge: HOME | End: 2021-06-22
Attending: EMERGENCY MEDICINE
Payer: COMMERCIAL

## 2021-06-22 VITALS
RESPIRATION RATE: 16 BRPM | OXYGEN SATURATION: 97 % | TEMPERATURE: 98.2 F | HEIGHT: 62 IN | HEART RATE: 60 BPM | SYSTOLIC BLOOD PRESSURE: 150 MMHG | WEIGHT: 205.44 LBS | DIASTOLIC BLOOD PRESSURE: 82 MMHG | BODY MASS INDEX: 37.81 KG/M2

## 2021-06-22 DIAGNOSIS — R10.9 LEFT FLANK PAIN: Primary | ICD-10-CM

## 2021-06-22 LAB
A/G RATIO: 1.2 (ref 1.1–2.2)
ALBUMIN SERPL-MCNC: 4.3 G/DL (ref 3.4–5)
ALP BLD-CCNC: 108 U/L (ref 40–129)
ALT SERPL-CCNC: 16 U/L (ref 10–40)
ANION GAP SERPL CALCULATED.3IONS-SCNC: 10 MMOL/L (ref 3–16)
AST SERPL-CCNC: 17 U/L (ref 15–37)
BACTERIA: ABNORMAL /HPF
BASOPHILS ABSOLUTE: 0 K/UL (ref 0–0.2)
BASOPHILS RELATIVE PERCENT: 0.4 %
BILIRUB SERPL-MCNC: 0.3 MG/DL (ref 0–1)
BILIRUBIN URINE: NEGATIVE
BLOOD, URINE: ABNORMAL
BUN BLDV-MCNC: 14 MG/DL (ref 7–20)
CALCIUM SERPL-MCNC: 10.3 MG/DL (ref 8.3–10.6)
CHLORIDE BLD-SCNC: 102 MMOL/L (ref 99–110)
CLARITY: ABNORMAL
CO2: 27 MMOL/L (ref 21–32)
COLOR: YELLOW
CREAT SERPL-MCNC: 0.7 MG/DL (ref 0.6–1.2)
EOSINOPHILS ABSOLUTE: 0.2 K/UL (ref 0–0.6)
EOSINOPHILS RELATIVE PERCENT: 2.4 %
EPITHELIAL CELLS, UA: ABNORMAL /HPF (ref 0–5)
GFR AFRICAN AMERICAN: >60
GFR NON-AFRICAN AMERICAN: >60
GLOBULIN: 3.7 G/DL
GLUCOSE BLD-MCNC: 120 MG/DL (ref 70–99)
GLUCOSE URINE: NEGATIVE MG/DL
HCT VFR BLD CALC: 38.1 % (ref 36–48)
HEMOGLOBIN: 12.8 G/DL (ref 12–16)
KETONES, URINE: NEGATIVE MG/DL
LEUKOCYTE ESTERASE, URINE: NEGATIVE
LIPASE: 28 U/L (ref 13–60)
LYMPHOCYTES ABSOLUTE: 3.2 K/UL (ref 1–5.1)
LYMPHOCYTES RELATIVE PERCENT: 34.2 %
MCH RBC QN AUTO: 31.6 PG (ref 26–34)
MCHC RBC AUTO-ENTMCNC: 33.5 G/DL (ref 31–36)
MCV RBC AUTO: 94.5 FL (ref 80–100)
MICROSCOPIC EXAMINATION: YES
MONOCYTES ABSOLUTE: 0.7 K/UL (ref 0–1.3)
MONOCYTES RELATIVE PERCENT: 7.1 %
MUCUS: ABNORMAL /LPF
NEUTROPHILS ABSOLUTE: 5.2 K/UL (ref 1.7–7.7)
NEUTROPHILS RELATIVE PERCENT: 55.9 %
NITRITE, URINE: NEGATIVE
PDW BLD-RTO: 13.1 % (ref 12.4–15.4)
PH UA: 6 (ref 5–8)
PLATELET # BLD: 250 K/UL (ref 135–450)
PMV BLD AUTO: 8.5 FL (ref 5–10.5)
POTASSIUM REFLEX MAGNESIUM: 3.9 MMOL/L (ref 3.5–5.1)
PROTEIN UA: NEGATIVE MG/DL
RBC # BLD: 4.03 M/UL (ref 4–5.2)
RBC UA: ABNORMAL /HPF (ref 0–4)
SODIUM BLD-SCNC: 139 MMOL/L (ref 136–145)
SPECIFIC GRAVITY UA: 1.02 (ref 1–1.03)
TOTAL PROTEIN: 8 G/DL (ref 6.4–8.2)
URINE REFLEX TO CULTURE: ABNORMAL
URINE TYPE: ABNORMAL
UROBILINOGEN, URINE: 0.2 E.U./DL
WBC # BLD: 9.3 K/UL (ref 4–11)
WBC UA: ABNORMAL /HPF (ref 0–5)

## 2021-06-22 PROCEDURE — 83690 ASSAY OF LIPASE: CPT

## 2021-06-22 PROCEDURE — 2580000003 HC RX 258: Performed by: EMERGENCY MEDICINE

## 2021-06-22 PROCEDURE — 81001 URINALYSIS AUTO W/SCOPE: CPT

## 2021-06-22 PROCEDURE — 74177 CT ABD & PELVIS W/CONTRAST: CPT

## 2021-06-22 PROCEDURE — 96374 THER/PROPH/DIAG INJ IV PUSH: CPT

## 2021-06-22 PROCEDURE — 6360000004 HC RX CONTRAST MEDICATION: Performed by: EMERGENCY MEDICINE

## 2021-06-22 PROCEDURE — 80053 COMPREHEN METABOLIC PANEL: CPT

## 2021-06-22 PROCEDURE — 85025 COMPLETE CBC W/AUTO DIFF WBC: CPT

## 2021-06-22 PROCEDURE — 6360000002 HC RX W HCPCS: Performed by: EMERGENCY MEDICINE

## 2021-06-22 PROCEDURE — 99283 EMERGENCY DEPT VISIT LOW MDM: CPT

## 2021-06-22 PROCEDURE — 96361 HYDRATE IV INFUSION ADD-ON: CPT

## 2021-06-22 RX ORDER — CYCLOBENZAPRINE HCL 10 MG
10 TABLET ORAL 2 TIMES DAILY PRN
Qty: 20 TABLET | Refills: 0 | Status: SHIPPED | OUTPATIENT
Start: 2021-06-22 | End: 2021-07-02

## 2021-06-22 RX ORDER — KETOROLAC TROMETHAMINE 15 MG/ML
15 INJECTION, SOLUTION INTRAMUSCULAR; INTRAVENOUS ONCE
Status: COMPLETED | OUTPATIENT
Start: 2021-06-22 | End: 2021-06-22

## 2021-06-22 RX ORDER — MORPHINE SULFATE 4 MG/ML
4 INJECTION, SOLUTION INTRAMUSCULAR; INTRAVENOUS ONCE
Status: COMPLETED | OUTPATIENT
Start: 2021-06-22 | End: 2021-06-22

## 2021-06-22 RX ORDER — 0.9 % SODIUM CHLORIDE 0.9 %
1000 INTRAVENOUS SOLUTION INTRAVENOUS ONCE
Status: COMPLETED | OUTPATIENT
Start: 2021-06-22 | End: 2021-06-22

## 2021-06-22 RX ADMIN — MORPHINE SULFATE 4 MG: 4 INJECTION, SOLUTION INTRAMUSCULAR; INTRAVENOUS at 09:46

## 2021-06-22 RX ADMIN — KETOROLAC TROMETHAMINE 15 MG: 15 INJECTION, SOLUTION INTRAMUSCULAR; INTRAVENOUS at 09:46

## 2021-06-22 RX ADMIN — SODIUM CHLORIDE 1000 ML: 9 INJECTION, SOLUTION INTRAVENOUS at 09:47

## 2021-06-22 RX ADMIN — IOPAMIDOL 80 ML: 755 INJECTION, SOLUTION INTRAVENOUS at 10:32

## 2021-06-22 ASSESSMENT — ENCOUNTER SYMPTOMS
VOMITING: 0
SHORTNESS OF BREATH: 0
TROUBLE SWALLOWING: 0
WHEEZING: 0
ABDOMINAL PAIN: 0
FACIAL SWELLING: 0
NAUSEA: 0
STRIDOR: 0
BACK PAIN: 1
VOICE CHANGE: 0
COLOR CHANGE: 0

## 2021-06-22 ASSESSMENT — PAIN DESCRIPTION - PAIN TYPE: TYPE: ACUTE PAIN

## 2021-06-22 ASSESSMENT — PAIN SCALES - GENERAL
PAINLEVEL_OUTOF10: 10
PAINLEVEL_OUTOF10: 10
PAINLEVEL_OUTOF10: 4

## 2021-06-22 ASSESSMENT — PAIN DESCRIPTION - ORIENTATION: ORIENTATION: LEFT

## 2021-06-22 ASSESSMENT — PAIN DESCRIPTION - LOCATION: LOCATION: BACK;FLANK

## 2021-06-22 ASSESSMENT — PAIN DESCRIPTION - DESCRIPTORS: DESCRIPTORS: STABBING

## 2021-06-22 NOTE — ED PROVIDER NOTES
[address] [facility]  eMERGENCY dEPARTMENT eNCOUnter      Pt Name: Estrellita Beverly  MRN: [id number]  [place] 1960  Date of evaluation: 6/22/2021  Provider: Damon Aviles MD    [address] [place] [address]       Chief Complaint   Patient presents with    Back Pain    Flank Pain         HISTORY OF PRESENT ILLNESS   (Location/Symptom, Timing/Onset, Context/Setting, Quality, Duration, Modifying Factors, Severity)  Note limiting factors. Estrellita Beverly is a 61 y.o. female with hx of chronic pain, hypertension, and small bowel obstruction who presents with 1 day of left flank pain. Patient reports that bending and moving worsens the pain being so only mildly improves it. She denies any nausea vomiting or diarrhea. She denies any urinary symptoms. She denies any numbness or neurologic symptoms. Reports her pain is severe, constant, and worsening. HPI    Nursing Notes were reviewed. REVIEW OFSYSTEMS    (2-9 systems for level 4, 10 or more for level 5)     Review of Systems   Constitutional: Negative for appetite change, fever and unexpected weight change. HENT: Negative for facial swelling, trouble swallowing and voice change. Eyes: Negative for visual disturbance. Respiratory: Negative for shortness of breath, wheezing and stridor. Cardiovascular: Negative for chest pain and palpitations. Gastrointestinal: Negative for abdominal pain, nausea and vomiting. Genitourinary: Positive for flank pain. Negative for dysuria and vaginal bleeding. Musculoskeletal: Positive for back pain. Negative for neck pain and neck stiffness. Skin: Negative for color change and wound. Neurological: Negative for seizures and syncope. Psychiatric/Behavioral: Negative for self-injury and suicidal ideas. Except as noted above the remainder of the review of systems was reviewed and negative.        PAST MEDICAL HISTORY     Past Medical History:   Diagnosis Date    Chronic pain     Running Out of Food in the Last Year:    [address] in the Last Year:    Transportation Needs:     Lack of Transportation (Medical):  Lack of Transportation (Non-Medical):    Physical Activity:     Days of Exercise per Week:     Minutes of Exercise per Session:    Stress:     Feeling of Stress :    Social Connections:     Frequency of Communication with Friends and Family:     Frequency of Social Gatherings with Friends and Family:     Attends Baptism Services:     Active Member of Clubs or Organizations:     Attends Club or Organization Meetings:     Marital Status:    Intimate Partner Violence:     Fear of Current or Ex-Partner:     Emotionally Abused:     Physically Abused:     Sexually Abused:          PHYSICAL EXAM    (up to 7 for level 4, 8 or more for level 5)     ED Triage Vitals [06/22/21 0914]   BP Temp Temp Source Pulse Resp SpO2 Height Weight   139/75 98.2 °F (36.8 °C) Oral 85 16 100 % 5' 2\" (1.575 m) 205 lb 7 oz (93.2 kg)       Physical Exam  Vitals and nursing note reviewed. Constitutional:       Appearance: She is well-developed. She is not diaphoretic. HENT:      Head: Normocephalic and atraumatic. Right Ear: External ear normal.      Left Ear: External ear normal.   Eyes:      Conjunctiva/sclera: Conjunctivae normal.   Neck:      Vascular: No JVD. Trachea: No tracheal deviation. Cardiovascular:      Rate and Rhythm: Normal rate. Pulmonary:      Effort: Pulmonary effort is normal. No respiratory distress. Breath sounds: Normal breath sounds. No wheezing. Abdominal:      General: There is no distension. Palpations: Abdomen is soft. Tenderness: There is abdominal tenderness. There is no guarding or rebound. Comments: Left upper quadrant, left flank, and left CVA tenderness to percussion. No rebound, guarding, peritoneal signs. Musculoskeletal:         General: Tenderness present. No deformity. Normal range of motion.       Cervical back: Neck supple. Comments: Mild left-sided paraspinal lumbar tenderness to palpation. No midline tenderness to palpation. No palpable deformities. Skin:     General: Skin is warm and dry. Neurological:      Mental Status: She is alert. Cranial Nerves: No cranial nerve deficit. Comments: 5 and 5 strength in all 4 extremities. Sensation intact in all 4 extremities. No focal neurologic deficits. DIAGNOSTIC RESULTS         RADIOLOGY:     Interpretation per the Radiologist below, if available at the time of this note:    CT ABDOMEN PELVIS W IV CONTRAST Additional Contrast? None   Final Result      1. No acute abdominal or pelvic abnormality. Nonobstructive bowel gas pattern. No focal bowel inflammation identified. 2. Tiny fat-containing umbilical hernia.                   ED BEDSIDE ULTRASOUND:   Performed by ED Physician - none    LABS:  Labs Reviewed   COMPREHENSIVE METABOLIC PANEL W/ REFLEX TO MG FOR LOW K - Abnormal; Notable for the following components:       Result Value    Glucose 120 (*)     All other components within normal limits    Narrative:     Performed at:  [facility]  [employer]  [facility]   Phone [phone]   URINE RT REFLEX TO CULTURE - Abnormal; Notable for the following components:    Clarity, UA SL CLOUDY (*)     Blood, Urine MODERATE (*)     All other components within normal limits    Narrative:     Performed at:  [facility]  [employer]   Phone [phone]   MICROSCOPIC URINALYSIS - Abnormal; Notable for the following components:    Mucus, UA Rare (*)     RBC, UA 11-20 (*)     Epithelial Cells, UA 6-10 (*)     Bacteria, UA 1+ (*)     All other components within normal limits    Narrative:     Performed at:  [facility]  [place]  [place] [facility]   Phone [phone]   CBC WITH AUTO DIFFERENTIAL    Narrative:     Performed at:  [facility]) [facility]  [address]   Phone [phone]   LIPASE    Narrative:     Performed at:  [facility]  [address]   Phone [phone]       All otherlabs were within normal range or not returned as of this dictation. EMERGENCY DEPARTMENT COURSE and DIFFERENTIAL DIAGNOSIS/MDM:   Vitals:    Vitals:    06/22/21 0914   BP: 139/75   Pulse: 85   Resp: 16   Temp: 98.2 °F (36.8 °C)   TempSrc: Oral   SpO2: 100%   Weight: 205 lb 7 oz (93.2 kg)   Height: 5' 2\" (1.575 m)         MDM  Laboratory evaluation is unremarkable, all vital signs are within normal limits, and CT abdomen pelvis does not show any acute abdominal or pelvic pathology. It does show tiny fat-containing umbilical hernia which I have discussed with the patient however she does not have any periumbilical abdominal pain and I feel this is a dental finding. Feel she is appropriate for primary care referral and strict ER return precautions for any new or worsening symptoms. I primarily suspect muscular pain at this time. The patient's pain has substantially improved with Toradol morphine emergency department I feel she is appropriate for Flexeril and outpatient follow-up. The patient expresses understanding and agreement with this plan and is discharged home. I estimate there is low risk for acute appendicitis, bowl obstruction, cholecystitis, diverticulitis, incarcerated hernia, mesenteric ischemia, pancreatitis, perforated bowl or ulcer, or abdominal aortic aneurism, thus I consider the discharge disposition reasonable. Also, there is no evidence or peritonitis, sepsis or toxicity. We have discussed the diagnosis and risks and agree with discharging home to follow-up with their primary doctor.  We also discussed returning to the Emergency Department immediately if new or worsening symptoms occur and have discussed the symptoms which are most concerning (e.g., bloody stool, fever, changing or worsening pain, vomiting) that necessitate immediate return. Procedures    FINAL IMPRESSION      1. Left flank pain          DISPOSITION/PLAN   DISPOSITION Decision To Discharge 06/22/2021 10:59:57 AM      PATIENT REFERRED TO:  [facility] [place]  [address] [facility]  [address] [facility]    In 1 week  Ask for appointment with Primary Care Doctor    [facility] Emergency Department  [address] [place] [address]  [phone]    If symptoms worsen      DISCHARGE MEDICATIONS:  New Prescriptions    CYCLOBENZAPRINE (FLEXERIL) 10 MG TABLET    Take 1 tablet by mouth 2 times daily as needed for Muscle spasms          (Please note that portions of this note were completed with a voice recognition program.  Efforts were made to edit the dictations but occasionally words aremis-transcribed. )    Bonnie Paniagua MD (electronically signed)  Attending Emergency Physician          Bonnie Paniagua MD  06/22/21 1415

## 2021-06-22 NOTE — ED TRIAGE NOTES
Patient c/o left sided flank and back pain that began last night while walking to get something to eat. States pain worsens when sitting. Took cough medication for a cough she noted just this morning. Denies dysuria.

## 2021-09-14 ENCOUNTER — APPOINTMENT (OUTPATIENT)
Dept: GENERAL RADIOLOGY | Age: 61
End: 2021-09-14
Payer: COMMERCIAL

## 2021-09-14 ENCOUNTER — HOSPITAL ENCOUNTER (EMERGENCY)
Age: 61
Discharge: HOME | End: 2021-09-14
Attending: EMERGENCY MEDICINE
Payer: COMMERCIAL

## 2021-09-14 VITALS
SYSTOLIC BLOOD PRESSURE: 175 MMHG | OXYGEN SATURATION: 100 % | DIASTOLIC BLOOD PRESSURE: 86 MMHG | HEART RATE: 92 BPM | TEMPERATURE: 98 F | RESPIRATION RATE: 17 BRPM

## 2021-09-14 DIAGNOSIS — S80.11XA CONTUSION OF MULTIPLE SITES OF RIGHT LOWER EXTREMITY, INITIAL ENCOUNTER: Primary | ICD-10-CM

## 2021-09-14 PROCEDURE — 6370000000 HC RX 637 (ALT 250 FOR IP): Performed by: EMERGENCY MEDICINE

## 2021-09-14 PROCEDURE — 73590 X-RAY EXAM OF LOWER LEG: CPT

## 2021-09-14 PROCEDURE — 99283 EMERGENCY DEPT VISIT LOW MDM: CPT

## 2021-09-14 PROCEDURE — 73502 X-RAY EXAM HIP UNI 2-3 VIEWS: CPT

## 2021-09-14 RX ORDER — CYCLOBENZAPRINE HCL 10 MG
10 TABLET ORAL 3 TIMES DAILY PRN
Qty: 21 TABLET | Refills: 0 | Status: SHIPPED | OUTPATIENT
Start: 2021-09-14 | End: 2021-09-24

## 2021-09-14 RX ORDER — HYDROCODONE BITARTRATE AND ACETAMINOPHEN 5; 325 MG/1; MG/1
1 TABLET ORAL ONCE
Status: COMPLETED | OUTPATIENT
Start: 2021-09-14 | End: 2021-09-14

## 2021-09-14 RX ADMIN — HYDROCODONE BITARTRATE AND ACETAMINOPHEN 1 TABLET: 5; 325 TABLET ORAL at 21:26

## 2021-09-14 ASSESSMENT — PAIN DESCRIPTION - DESCRIPTORS: DESCRIPTORS: THROBBING

## 2021-09-14 ASSESSMENT — PAIN SCALES - GENERAL
PAINLEVEL_OUTOF10: 10
PAINLEVEL_OUTOF10: 10

## 2021-09-14 ASSESSMENT — PAIN DESCRIPTION - LOCATION: LOCATION: LEG

## 2021-09-14 ASSESSMENT — PAIN DESCRIPTION - ORIENTATION: ORIENTATION: RIGHT

## 2021-09-14 ASSESSMENT — PAIN DESCRIPTION - PAIN TYPE: TYPE: ACUTE PAIN

## 2021-09-14 ASSESSMENT — PAIN DESCRIPTION - FREQUENCY: FREQUENCY: CONTINUOUS

## 2021-09-15 NOTE — ED NOTES
Patient prepared for and ready to be discharged. Patient discharged at this time in no acute distress after verbalizing understanding of discharge instructions. Patient left after receiving After Visit Summary instructions.         Ghazala Drew RN  09/14/21 9198

## 2021-09-15 NOTE — ED PROVIDER NOTES
[facility] Emergency Department      Pt Name: Yeny Cuellar  MRN: [id number]  [place] 1960  Date of evaluation: 9/14/2021  Provider: Queta Skelton MD  CHIEF COMPLAINT  Chief Complaint   Patient presents with   Pleasant [place]    Leg Pain     rt     HPI  Yeny Cuellar is a 61 y.o. female who presents because of a fall yesterday. She was walking in the grass and believes there was a stump that she tripped on. She fell and landed, injuring her right leg. She says that her glasses bumped off but denies any significant head injury. She is not on any blood thinning medications. She does have some soreness of the right upper back but denies any other injury. Denies any numbness or tingling. She is able to bear weight but is painful. REVIEW OF SYSTEMS:  LOC absent, no breathing difficulty, no abdominal pain Pertinent positives and negatives as per the HPI. All other review of systems reviewed and negative. Nursing notes reviewed. PAST MEDICAL HISTORY  Past Medical History:   Diagnosis Date    Chronic pain     Hypertension      SURGICAL HISTORY  Past Surgical History:   Procedure Laterality Date    APPENDECTOMY      CHOLECYSTECTOMY      HYSTERECTOMY       MEDICATIONS:  No current facility-administered medications on file prior to encounter.      Current Outpatient Medications on File Prior to Encounter   Medication Sig Dispense Refill    amLODIPine (NORVASC) 5 MG tablet Take 1 tablet by mouth daily 30 tablet 0    pantoprazole (PROTONIX) 40 MG tablet Take 1 tablet by mouth every morning (before breakfast) 30 tablet 3    ondansetron (ZOFRAN-ODT) 4 MG disintegrating tablet Take 1 tablet by mouth every 8 hours as needed for Nausea or Vomiting 20 tablet 0    hydroCHLOROthiazide (HYDRODIURIL) 25 MG tablet Take 1 tablet by mouth every morning 14 tablet 0    ROSUVASTATIN CALCIUM PO Take by mouth      SPIRONOLACTONE PO Take by mouth      CHLORTHALIDONE PO Take by mouth      Acetaminophen (TYLENOL PO) Take by mouth      docusate sodium (COLACE) 100 MG capsule Take 100 mg by mouth 2 times daily      ibuprofen (IBU) 600 MG tablet Take 1 tablet by mouth every 8 hours as needed for Pain 20 tablet 0    Multiple Vitamins-Minerals (THERAPEUTIC MULTIVITAMIN-MINERALS) tablet Take 1 tablet by mouth daily      hydrochlorothiazide (HYDRODIURIL) 25 MG tablet Take 1 tablet by mouth daily 30 tablet 2     ALLERGIES  Aspirin and Pcn [penicillins]  FAMILY HISTORY:  Not relevant    SOCIAL HISTORY:  Social History     Tobacco Use    Smoking status: Current Some Day Smoker    Smokeless tobacco: Never Used   Substance Use Topics    Alcohol use: No     Comment: occassionally    Drug use: Yes     Types: Marijuana     IMMUNIZATIONS:   Immunization History   Administered Date(s) Administered    COVID-19, Pfizer, PF, 30mcg/0.3mL 05/22/2021, 06/12/2021       PHYSICAL EXAM  VITAL SIGNS:  Blood pressure (!) 175/86, pulse 92, temperature 98 °F (36.7 °C), resp. rate 17, SpO2 100 %, not currently breastfeeding. Constitutional:  61 y.o. female nontoxic  HENT:  Mucous membranes moist, atraumatic  Eyes:   Conjunctiva clear, no icterus  Neck:  Supple, trachea midline, no ML tenderness  Thorax & Lungs:  No accessory muscle usage  Abdomen:  Soft, non distended,no tenderness  Back:  No new deformity, no midline step off  Genitalia:  Deferred  Rectal:  Deferred  Extremities:  No cyanosis, distally NVI, bruise below the right knee with some soft tissue swelling, localized tenderness, pedal pulses normal, peripheral sensation is normal, some pain to the right hip area  Skin:  Warm, dry  Neurologic:  Alert, no slurred speech, PERRL, coordination of extremities normal, extremely antalgic gait  Psychiatric:  Affect appropriate    DIAGNOSTIC RESULTS:   RADIOLOGY:    Plain x-rays were viewed by me:   XR TIBIA FIBULA RIGHT (2 VIEWS)   Final Result   Impression:   1. No acute fracture.       XR HIP 2-3 VW W PELVIS RIGHT   Final Result Impression:   1. No acute fracture. ED COURSE:    Medications administered:  Medications   HYDROcodone-acetaminophen (NORCO) 5-325 MG per tablet 1 tablet (1 tablet Oral Given 9/14/21 2126)     PROCEDURES:  None    CRITICAL CARE:  None    CONSULTATIONS:  None    MEDICAL DECISION MAKING: Estela Eckert is a 61 y.o. female who presented because of a fall. Images are negative for acute fracture and we have provided soft tissue pain instructions. No suspicion for occult neurovascular injury or compartment syndrome. Estela Eckert was given appropriate discharge instructions. Referral to follow up provider. Differential Diagnosis:  Fracture, sprain, concussion, syncope, CVA, other  Discharge Medication List as of 9/14/2021 10:00 PM      START taking these medications    Details   cyclobenzaprine (FLEXERIL) 10 MG tablet Take 1 tablet by mouth 3 times daily as needed for Muscle spasms, Disp-21 tablet, R-0Print           FOLLOW UP:    your doctor    Schedule an appointment as soon as possible for a visit       [address] Emergency Department  [address] [address]  [phone]  Go to   If symptoms worsen    FINAL IMPRESSION:    1. Contusion of multiple sites of right lower extremity, initial encounter        (Please note that I used voice recognition software to generate this note.   Occasionally words are mistranscribed despite my efforts to edit errors.)        Dmitry Brandon MD  09/15/21 1955

## 2022-04-01 ENCOUNTER — APPOINTMENT (OUTPATIENT)
Dept: CT IMAGING | Age: 62
End: 2022-04-01
Payer: COMMERCIAL

## 2022-04-01 ENCOUNTER — HOSPITAL ENCOUNTER (EMERGENCY)
Age: 62
Discharge: HOME | End: 2022-04-01
Attending: EMERGENCY MEDICINE
Payer: COMMERCIAL

## 2022-04-01 ENCOUNTER — APPOINTMENT (OUTPATIENT)
Dept: GENERAL RADIOLOGY | Age: 62
End: 2022-04-01
Payer: COMMERCIAL

## 2022-04-01 VITALS
WEIGHT: 202.31 LBS | TEMPERATURE: 98.3 F | HEIGHT: 62 IN | OXYGEN SATURATION: 100 % | DIASTOLIC BLOOD PRESSURE: 93 MMHG | BODY MASS INDEX: 37.23 KG/M2 | RESPIRATION RATE: 18 BRPM | HEART RATE: 93 BPM | SYSTOLIC BLOOD PRESSURE: 158 MMHG

## 2022-04-01 DIAGNOSIS — G44.86 CERVICOGENIC HEADACHE: ICD-10-CM

## 2022-04-01 DIAGNOSIS — M54.81 OCCIPITAL NEURALGIA OF RIGHT SIDE: Primary | ICD-10-CM

## 2022-04-01 DIAGNOSIS — R20.2 PARESTHESIA OF LOWER LIMB: ICD-10-CM

## 2022-04-01 LAB
A/G RATIO: 1.1 (ref 1.1–2.2)
ALBUMIN SERPL-MCNC: 4.3 G/DL (ref 3.4–5)
ALP BLD-CCNC: 115 U/L (ref 40–129)
ALT SERPL-CCNC: 20 U/L (ref 10–40)
ANION GAP SERPL CALCULATED.3IONS-SCNC: 12 MMOL/L (ref 3–16)
AST SERPL-CCNC: 18 U/L (ref 15–37)
BASOPHILS ABSOLUTE: 0.1 K/UL (ref 0–0.2)
BASOPHILS RELATIVE PERCENT: 1.2 %
BILIRUB SERPL-MCNC: 0.3 MG/DL (ref 0–1)
BUN BLDV-MCNC: 12 MG/DL (ref 7–20)
CALCIUM SERPL-MCNC: 10.8 MG/DL (ref 8.3–10.6)
CHLORIDE BLD-SCNC: 107 MMOL/L (ref 99–110)
CO2: 24 MMOL/L (ref 21–32)
CREAT SERPL-MCNC: 0.6 MG/DL (ref 0.6–1.2)
EOSINOPHILS ABSOLUTE: 0.1 K/UL (ref 0–0.6)
EOSINOPHILS RELATIVE PERCENT: 1 %
GFR AFRICAN AMERICAN: >60
GFR NON-AFRICAN AMERICAN: >60
GLUCOSE BLD-MCNC: 104 MG/DL (ref 70–99)
HCT VFR BLD CALC: 38 % (ref 36–48)
HEMOGLOBIN: 12.9 G/DL (ref 12–16)
LYMPHOCYTES ABSOLUTE: 2.7 K/UL (ref 1–5.1)
LYMPHOCYTES RELATIVE PERCENT: 30.3 %
MCH RBC QN AUTO: 31.5 PG (ref 26–34)
MCHC RBC AUTO-ENTMCNC: 34 G/DL (ref 31–36)
MCV RBC AUTO: 92.5 FL (ref 80–100)
MONOCYTES ABSOLUTE: 0.7 K/UL (ref 0–1.3)
MONOCYTES RELATIVE PERCENT: 8 %
NEUTROPHILS ABSOLUTE: 5.2 K/UL (ref 1.7–7.7)
NEUTROPHILS RELATIVE PERCENT: 59.5 %
PDW BLD-RTO: 12.9 % (ref 12.4–15.4)
PLATELET # BLD: 253 K/UL (ref 135–450)
PMV BLD AUTO: 9.8 FL (ref 5–10.5)
POTASSIUM REFLEX MAGNESIUM: 3.6 MMOL/L (ref 3.5–5.1)
RBC # BLD: 4.11 M/UL (ref 4–5.2)
SODIUM BLD-SCNC: 143 MMOL/L (ref 136–145)
TOTAL PROTEIN: 8.2 G/DL (ref 6.4–8.2)
TROPONIN: <0.01 NG/ML
WBC # BLD: 8.8 K/UL (ref 4–11)

## 2022-04-01 PROCEDURE — 71045 X-RAY EXAM CHEST 1 VIEW: CPT

## 2022-04-01 PROCEDURE — 85025 COMPLETE CBC W/AUTO DIFF WBC: CPT

## 2022-04-01 PROCEDURE — 93005 ELECTROCARDIOGRAM TRACING: CPT | Performed by: EMERGENCY MEDICINE

## 2022-04-01 PROCEDURE — 96374 THER/PROPH/DIAG INJ IV PUSH: CPT

## 2022-04-01 PROCEDURE — 80053 COMPREHEN METABOLIC PANEL: CPT

## 2022-04-01 PROCEDURE — 99284 EMERGENCY DEPT VISIT MOD MDM: CPT

## 2022-04-01 PROCEDURE — 6360000004 HC RX CONTRAST MEDICATION: Performed by: EMERGENCY MEDICINE

## 2022-04-01 PROCEDURE — 84484 ASSAY OF TROPONIN QUANT: CPT

## 2022-04-01 PROCEDURE — 6360000002 HC RX W HCPCS: Performed by: EMERGENCY MEDICINE

## 2022-04-01 PROCEDURE — 70450 CT HEAD/BRAIN W/O DYE: CPT

## 2022-04-01 PROCEDURE — 70498 CT ANGIOGRAPHY NECK: CPT

## 2022-04-01 RX ORDER — LIDOCAINE HYDROCHLORIDE 10 MG/ML
5 INJECTION, SOLUTION INFILTRATION; PERINEURAL ONCE
Status: DISCONTINUED | OUTPATIENT
Start: 2022-04-01 | End: 2022-04-01 | Stop reason: HOSPADM

## 2022-04-01 RX ORDER — KETOROLAC TROMETHAMINE 30 MG/ML
15 INJECTION, SOLUTION INTRAMUSCULAR; INTRAVENOUS ONCE
Status: COMPLETED | OUTPATIENT
Start: 2022-04-01 | End: 2022-04-01

## 2022-04-01 RX ORDER — BUPIVACAINE HYDROCHLORIDE 5 MG/ML
30 INJECTION, SOLUTION EPIDURAL; INTRACAUDAL ONCE
Status: DISCONTINUED | OUTPATIENT
Start: 2022-04-01 | End: 2022-04-01 | Stop reason: HOSPADM

## 2022-04-01 RX ADMIN — KETOROLAC TROMETHAMINE 15 MG: 30 INJECTION, SOLUTION INTRAMUSCULAR; INTRAVENOUS at 13:29

## 2022-04-01 RX ADMIN — IOPAMIDOL 80 ML: 755 INJECTION, SOLUTION INTRAVENOUS at 11:19

## 2022-04-01 ASSESSMENT — ENCOUNTER SYMPTOMS
SHORTNESS OF BREATH: 0
DIARRHEA: 0
ABDOMINAL DISTENTION: 0
BACK PAIN: 0
PHOTOPHOBIA: 0
WHEEZING: 0
RHINORRHEA: 0
VOMITING: 0
NAUSEA: 0
COUGH: 0

## 2022-04-01 ASSESSMENT — PAIN SCALES - GENERAL: PAINLEVEL_OUTOF10: 3

## 2022-04-01 NOTE — ED NOTES
Patient given  discharge instructions verbal and written, patient verbalized understanding. Alert/oriented X4, Clear speech.   Patient exhibits no distress, ambulates with steady gait per self leaving unit, no further request.     Robinson Easton RN  04/01/22 6329

## 2022-04-01 NOTE — ED PROVIDER NOTES
Emergency Department Provider Note  Location: [address] [place] [address]  2022     Patient Identification  Joel Sidhu is a 64 y.o. female    Chief Complaint  Headache (For 1 week)          HPI  (History provided by patient)  Patient is a 60-year-old female with a history of hypertension and reported gastric reflux who presents with chief complaint of headache for 1 week. Patient reports that she had a slow onset occipital right-sided headache that started last Saturday. Constant achy pain at the occipital ridge. Does not radiate. No exacerbating alleviating factors. Not associated with any vision changes nausea vomiting other neck pain. Patient reports over the past week she has noticed episodes where \"my right thigh will go numb until I get up and start walking around\". Patient denies any lasting numbness and no weakness otherwise. She reports she was worried because her sister  of a brain aneurysm and wanted to get evaluated. Patient denies any trauma. Denies blood thinners or anticoagulants. I have reviewed the following nursing documentation:  Allergies: Allergies   Allergen Reactions    Aspirin      Pt does not know     Pcn [Penicillins]        Past medical history:  has a past medical history of Chronic pain and Hypertension. Past surgical history:  has a past surgical history that includes Hysterectomy; Appendectomy; and Cholecystectomy. Home medications:   Prior to Admission medications    Medication Sig Start Date End Date Taking?  Authorizing Provider   pantoprazole (PROTONIX) 40 MG tablet Take 1 tablet by mouth every morning (before breakfast)  Patient not taking: Reported on 2022   Karlie Mack MD   ondansetron (ZOFRAN-ODT) 4 MG disintegrating tablet Take 1 tablet by mouth every 8 hours as needed for Nausea or Vomiting 21   ZAIN Castle - CNP   amLODIPine (NORVASC) 5 MG tablet Take 1 tablet by mouth daily  Patient not taking: Reported on 4/1/2022 9/14/20   Paresh Chi MD   hydroCHLOROthiazide (HYDRODIURIL) 25 MG tablet Take 1 tablet by mouth every morning  Patient not taking: Reported on 4/1/2022 9/14/20   Paresh Chi MD   ROSUVASTATIN CALCIUM PO Take by mouth    Historical Provider, MD   SPIRONOLACTONE PO Take by mouth  Patient not taking: Reported on 4/1/2022    Historical Provider, MD   CHLORTHALIDONE PO Take by mouth    Historical Provider, MD   Acetaminophen (TYLENOL PO) Take by mouth  Patient not taking: Reported on 4/1/2022    Historical Provider, MD   docusate sodium (COLACE) 100 MG capsule Take 100 mg by mouth 2 times daily    Historical Provider, MD   ibuprofen (IBU) 600 MG tablet Take 1 tablet by mouth every 8 hours as needed for Pain 6/1/18   Ayesha Morton MD   Multiple Vitamins-Minerals (THERAPEUTIC MULTIVITAMIN-MINERALS) tablet Take 1 tablet by mouth daily  Patient not taking: Reported on 4/1/2022    Historical Provider, MD   hydrochlorothiazide (HYDRODIURIL) 25 MG tablet Take 1 tablet by mouth daily 8/22/16   Silvia Almeida MD       Social history:  reports that she has been smoking. She has never used smokeless tobacco. She reports current drug use. Drug: Marijuana Masha Beat). She reports that she does not drink alcohol. Family history:  History reviewed. No pertinent family history. ROS  Review of Systems   Constitutional: Negative for chills and fever. HENT: Negative for congestion and rhinorrhea. Eyes: Negative for photophobia and visual disturbance. Respiratory: Negative for cough, shortness of breath and wheezing. Cardiovascular: Negative for chest pain and palpitations. Gastrointestinal: Negative for abdominal distention, diarrhea, nausea and vomiting. Genitourinary: Negative for dysuria and hematuria. Musculoskeletal: Negative for back pain and neck pain. Skin: Negative for rash and wound. Neurological: Positive for numbness and headaches.  Negative for syncope and weakness. Psychiatric/Behavioral: Negative for agitation and confusion. Exam  ED Triage Vitals [04/01/22 0927]   BP Temp Temp Source Pulse Resp SpO2 Height Weight   (!) 158/93 98.3 °F (36.8 °C) Oral 93 18 100 % 5' 2\" (1.575 m) 202 lb 5 oz (91.8 kg)       Physical Exam  Vitals and nursing note reviewed. Constitutional:       General: She is not in acute distress. Appearance: She is well-developed. HENT:      Head: Normocephalic and atraumatic. Nose: Nose normal. No congestion. Eyes:      General: No scleral icterus. Extraocular Movements: Extraocular movements intact. Conjunctiva/sclera: Conjunctivae normal.      Pupils: Pupils are equal, round, and reactive to light. Neck:      Comments: There is focal area of tenderness on the right occipital ridge. There is no swelling there is no crepitus. Otherwise no midline tenderness of the cervical spine. Cardiovascular:      Rate and Rhythm: Normal rate and regular rhythm. Heart sounds: No murmur heard. Comments: Strong equal distal pulses  Pulmonary:      Effort: Pulmonary effort is normal.      Breath sounds: Normal breath sounds. Abdominal:      General: There is no distension. Palpations: Abdomen is soft. Tenderness: There is no abdominal tenderness. Musculoskeletal:         General: No deformity. Normal range of motion. Cervical back: Normal range of motion and neck supple. Comments: No midline tenderness of spine no step-offs abrasions hematomas or objective evidence of trauma. Ranging all extremities equally, no deformities noted, neurovascularly intact extremities. Skin:     General: Skin is warm. Findings: No rash. Neurological:      Mental Status: She is alert and oriented to person, place, and time. Motor: No abnormal muscle tone.       Coordination: Coordination normal.      Comments: NIH stroke scale 0, no cranial nerve deficits appreciated, strength 5 out of 5 all extremities, sensation is intact, no central ataxia, no cerebellar signs present, no deviation of vertical skew. Sensation over occiput and posterior neck intact, normal flexion/ext of shoulder, elbow, wrist and fingers, normal finger abduction against resistance. Sensation of trunk intact throughout thoracic dermatomes. Psychiatric:         Mood and Affect: Mood normal.         Behavior: Behavior normal.           ED Course    ED Medication Orders (From admission, onward)    Start Ordered     Status Ordering Provider    04/01/22 1330 04/01/22 1322  ketorolac (TORADOL) injection 15 mg  ONCE         Ordered GILDARDOKITA JONG L    04/01/22 1245 04/01/22 1232  lidocaine 1 % injection 5 mL  ONCE         Acknowledged GILDARDOHARRY EASTN L    04/01/22 1245 04/01/22 1232  bupivacaine (PF) (MARCAINE) 0.5 % injection 150 mg  ONCE         Acknowledged JONG JIMENEZ    04/01/22 1119 04/01/22 1119  iopamidol (ISOVUE-370) 76 % injection 80 mL  IMG ONCE PRN         Last MAR action: Given - by Ade Batres on 04/01/22 at 1119 GILDARDOKITA JONG L          EKG  Normal sinus rhythm rate of 90 normal axis normal intervals no evidence of conduction abnormalities borderline left atrial enlargement, no diagnostic ischemic changes noted,       Radiology  CT Head WO Contrast    Result Date: 4/1/2022  CT HEAD WO CONTRAST Indication: Headache. Comparison: None. Technique:  CT head was performed without intravenous contrast. Coronal and sagittal reformations were created. Up-to-date CT equipment and radiation dose reduction techniques were employed. Findings: No acute intracranial hemorrhage or extra-axial fluid collection. No mass effect or midline shift. Gray-white matter differentiation is maintained. Mild patchy hypoattenuation in the deep and periventricular white matter nonspecific but compatible with chronic small vessel ischemia. Ventricles are normal in size. Basal cisterns are patent.  Atherosclerotic disease in the internal carotid arteries. The calvarium is intact. Visualized paranasal sinuses and mastoid air cells are clear. 1.  No acute intracranial hemorrhage or mass effect. 2.  Specific white matter hypoattenuation but compatible with chronic small vessel ischemia. XR CHEST PORTABLE    Result Date: 4/1/2022  PORTABLE CHEST Indication: Headache Comparison: 8/16/17 Findings: The cardiomediastinal silhouette is within normal limits. No focal consolidation, pleural effusion or pneumothorax. Bones are unremarkable. No acute cardiopulmonary findings. CTA HEAD NECK W CONTRAST    Result Date: 4/1/2022  CTA HEAD NECK W CONTRAST Indication: Headache. Comparison: None. Technique:  Head and neck CTA was performed with IV contrast. Multiplanar MIP reconstructions were created. 80 mL of Isovue-370 IV. Up-to-date CT equipment and radiation dose reduction techniques were employed. Imaging was analyzed utilizing the Kormeli software algorithm. Any reported stenosis measurements were calculated on the basis of diameter stenosis, per NASCET criteria. Findings:   CTA NECK: There is a 3 vessel aortic arch. Origins of the great vessels are patent without significant stenosis. Right common carotid artery without significant stenosis. Mostly noncalcified atherosclerotic plaque within the proximal right ICA. No significant stenosis. Left common carotid artery mild atherosclerosis the origin. No significant stenosis. Calcified and noncalcified plaque within the proximal left ICA. No significant stenosis. Bilateral vertebral arteries are codominant, congenitally small in caliber throughout. No significant vertebral artery stenosis. CTA HEAD: Atherosclerotic plaque within the bilateral distal internal carotid arteries. Moderate stenosis in the anterior genu the right cavernous ICA. No significant stenosis in the left ICA. Generally hypoplastic right A1 FELIPE.  Compensatory left A1 segment supplying the right A2 branch of the anterior communicating artery. No significant stenosis. Proximal branches of the middle cerebral arteries are patent and without significant stenosis. The basilar artery is congenitally small in caliber. No significant stenosis. Fetal origins of the bilateral PCAs via the posterior communicating arteries. No significant stenosis within the proximal PCA branches. No intracranial aneurysm or arterial venous malformation. 1.  No large vessel occlusion. 2.  Atherosclerotic disease in the cervical carotid arteries without significant stenosis per 3. Moderate stenosis in the cavernous segment of the right ICA. 4.  Hypoplastic vertebrobasilar circulation. The PCAs supplied by fetal origins.         Labs  Results for orders placed or performed during the hospital encounter of 04/01/22   CBC with Auto Differential   Result Value Ref Range    WBC 8.8 4.0 - 11.0 K/uL    RBC 4.11 4.00 - 5.20 M/uL    Hemoglobin 12.9 12.0 - 16.0 g/dL    Hematocrit 38.0 36.0 - 48.0 %    MCV 92.5 80.0 - 100.0 fL    MCH 31.5 26.0 - 34.0 pg    MCHC 34.0 31.0 - 36.0 g/dL    RDW 12.9 12.4 - 15.4 %    Platelets 974 723 - 842 K/uL    MPV 9.8 5.0 - 10.5 fL    Neutrophils % 59.5 %    Lymphocytes % 30.3 %    Monocytes % 8.0 %    Eosinophils % 1.0 %    Basophils % 1.2 %    Neutrophils Absolute 5.2 1.7 - 7.7 K/uL    Lymphocytes Absolute 2.7 1.0 - 5.1 K/uL    Monocytes Absolute 0.7 0.0 - 1.3 K/uL    Eosinophils Absolute 0.1 0.0 - 0.6 K/uL    Basophils Absolute 0.1 0.0 - 0.2 K/uL   Comprehensive Metabolic Panel w/ Reflex to MG   Result Value Ref Range    Sodium 143 136 - 145 mmol/L    Potassium reflex Magnesium 3.6 3.5 - 5.1 mmol/L    Chloride 107 99 - 110 mmol/L    CO2 24 21 - 32 mmol/L    Anion Gap 12 3 - 16    Glucose 104 (H) 70 - 99 mg/dL    BUN 12 7 - 20 mg/dL    CREATININE 0.6 0.6 - 1.2 mg/dL    GFR Non-African American >60 >60    GFR African American >60 >60    Calcium 10.8 (H) 8.3 - 10.6 mg/dL    Total Protein 8.2 6.4 - 8.2 g/dL    Albumin 4.3 3.4 - 5.0 g/dL Albumin/Globulin Ratio 1.1 1.1 - 2.2    Total Bilirubin 0.3 0.0 - 1.0 mg/dL    Alkaline Phosphatase 115 40 - 129 U/L    ALT 20 10 - 40 U/L    AST 18 15 - 37 U/L   Troponin   Result Value Ref Range    Troponin <0.01 <0.01 ng/mL   EKG 12 Lead   Result Value Ref Range    Ventricular Rate 87 BPM    Atrial Rate 87 BPM    P-R Interval 132 ms    QRS Duration 70 ms    Q-T Interval 370 ms    QTc Calculation (Bazett) 445 ms    P Axis 59 degrees    R Axis 14 degrees    T Axis 44 degrees    Diagnosis       ** Poor data quality, interpretation may be adversely affectedNormal sinus rhythmPossible Left atrial enlargementBorderline ECG     Nerve Block    Date/Time: 4/1/2022 1:25 PM  Performed by: Suzie Mistry MD  Authorized by: Suzie Mistry MD     Consent:     Consent obtained:  Verbal    Consent given by:  Patient    Risks discussed: Allergic reaction, bleeding, intravenous injection, infection, nerve damage, swelling, unsuccessful block and pain    Alternatives discussed:  No treatment  Location:     Body area:  Head    Head nerve:  Occipital    Laterality:  Right  Pre-procedure details:     Skin preparation:  2% chlorhexidine  Skin anesthesia (see MAR for exact dosages):     Skin anesthesia method:  Local infiltration    Local anesthetic:  Lidocaine 1% w/o epi and bupivacaine 0.5% w/o epi  Procedure details (see MAR for exact dosages): Block needle gauge:  27 G    Anesthetic injected:  Bupivacaine 0.5% w/o epi and lidocaine 1% w/o epi    Steroid injected:  None    Additive injected:  None    Injection procedure:  Anatomic landmarks identified, incremental injection, negative aspiration for blood, introduced needle and anatomic landmarks palpated  Post-procedure details:     Dressing:  None    Outcome:  Anesthesia achieved    Patient tolerance of procedure: Tolerated well, no immediate complications          MDM  Patient seen and evaluated. Relevant records reviewed.     69-year-old female presents with atypical headache for 1 week the right occipital area. She also reports transient episodes of paresthesias and decreased sensation on the medial right thigh. On exam she is in no acute distress her vitals notable for elevated blood blood pressure systolics 239D otherwise within normal limits. She has no focal deficits on exam.  She has no meningeal signs however she does have focal area of tenderness along the occipital nerve on the right side. Consistent with occipital neuralgia. However given the atypical nature and her concerns and family history of aneurysm and thigh paresthesias I did feel that CT CTA was indicated to further evaluate for vascular injury or SAH. Ultimately does not show any acute pathology. Chronic ischemic disease noted on CT and moderate stenosis of the right ICA was noted which I discussed with the patient. Recommend that she follow-up with neurology for this however I have low concern for stroke after getting further details of the peripheral neuropathy she is having. It is clearly positional related to her lying in certain positions. Occipital nerve block completely resolved her symptoms. I discussed appropriate follow-up with neuro PCP as well as return precautions. Patient agreeable to plan expressed understanding plan. I completed a structured, evidence-based clinical evaluation to screen for acute emergencies for the above complaints. Available evidence indicate that the patient is low risk and this is consistent with my clinical intuition. At this point I do not feel the patient requires further work up and it is reasonable to discharge the patient. The risk of further workup or hospitalization is likely higher than the likelihood of the patient having a dangerous emergent condition/outcome. It is, therefore, in the patients best interest not to do additional emergent testing.     I had a discussion with the patient and/or their surrogate regarding diagnosis, diagnostic testing results, treatment/ plan of care, and follow up. Incidental findings of labs/imaging also discussed. There was shared decision-making between myself as well as the patient and/or their surrogate and we are all in agreement with discharge home. There was an opportunity for questions and all questions were answered to the best of my ability and to the satisfaction of the patient and/or patient family. Patient agreed to follow up with neuro for further evaluation/treatment. The patient was given strict return precautions as we discussed symptoms that would necessitate return to the ED. Patient will return to ED for new/worsening symptoms. The patient verbalized their understanding and agreement with the above plan. Please refer to AVS for further details regarding discharge instructions. Clinical Impression:  1. Occipital neuralgia of right side    2. Cervicogenic headache    3. Paresthesia of lower limb          Disposition:  Discharge to home in good condition. Blood pressure (!) 158/93, pulse 93, temperature 98.3 °F (36.8 °C), temperature source Oral, resp. rate 18, height 5' 2\" (1.575 m), weight 202 lb 5 oz (91.8 kg), SpO2 100 %, not currently breastfeeding. Patient was given scripts for the following medications. I counseled patient how to take these medications. New Prescriptions    No medications on file       Disposition referral (if applicable): Lucius [place]  [id number] [address] [place] [id number]  [phone]    Schedule an appointment as soon as possible for a visit           Total critical care time is 0 minutes, which excludes separately billable procedures and updating family. Time spent is specifically for management of the presenting complaint and symptoms initially, direct bedside care, reevaluation, review of records, and consultation.   There was a high probability of clinically significant life-threatening deterioration in the patient's condition, which required my urgent intervention. This chart was generated in part by using Dragon Dictation system and may contain errors related to that system including errors in grammar, punctuation, and spelling, as well as words and phrases that may be inappropriate. If there are any questions or concerns please feel free to contact the dictating provider for clarification.      Angle Chacon MD  [address] Rizo MD  04/01/22 8524

## 2022-04-04 LAB
EKG ATRIAL RATE: 87 BPM
EKG DIAGNOSIS: NORMAL
EKG P AXIS: 59 DEGREES
EKG P-R INTERVAL: 132 MS
EKG Q-T INTERVAL: 370 MS
EKG QRS DURATION: 70 MS
EKG QTC CALCULATION (BAZETT): 445 MS
EKG R AXIS: 14 DEGREES
EKG T AXIS: 44 DEGREES
EKG VENTRICULAR RATE: 87 BPM

## 2022-04-04 PROCEDURE — 93010 ELECTROCARDIOGRAM REPORT: CPT | Performed by: INTERNAL MEDICINE

## 2022-05-29 ENCOUNTER — APPOINTMENT (OUTPATIENT)
Dept: GENERAL RADIOLOGY | Age: 62
End: 2022-05-29
Payer: COMMERCIAL

## 2022-05-29 ENCOUNTER — HOSPITAL ENCOUNTER (EMERGENCY)
Age: 62
Discharge: HOME | End: 2022-05-30
Attending: EMERGENCY MEDICINE
Payer: COMMERCIAL

## 2022-05-29 DIAGNOSIS — M54.6 PAIN IN THORACIC SPINE: Primary | ICD-10-CM

## 2022-05-29 LAB
ANION GAP SERPL CALCULATED.3IONS-SCNC: 15 MMOL/L (ref 3–16)
BASOPHILS ABSOLUTE: 0.1 K/UL (ref 0–0.2)
BASOPHILS RELATIVE PERCENT: 0.5 %
BUN BLDV-MCNC: 15 MG/DL (ref 7–20)
CALCIUM SERPL-MCNC: 10.1 MG/DL (ref 8.3–10.6)
CHLORIDE BLD-SCNC: 100 MMOL/L (ref 99–110)
CO2: 23 MMOL/L (ref 21–32)
CREAT SERPL-MCNC: 0.8 MG/DL (ref 0.6–1.2)
D DIMER: 0.36 UG/ML FEU (ref 0–0.6)
EOSINOPHILS ABSOLUTE: 0.2 K/UL (ref 0–0.6)
EOSINOPHILS RELATIVE PERCENT: 1.3 %
GFR AFRICAN AMERICAN: >60
GFR NON-AFRICAN AMERICAN: >60
GLUCOSE BLD-MCNC: 118 MG/DL (ref 70–99)
HCT VFR BLD CALC: 34.2 % (ref 36–48)
HEMOGLOBIN: 11.4 G/DL (ref 12–16)
LYMPHOCYTES ABSOLUTE: 4 K/UL (ref 1–5.1)
LYMPHOCYTES RELATIVE PERCENT: 35.1 %
MCH RBC QN AUTO: 31.2 PG (ref 26–34)
MCHC RBC AUTO-ENTMCNC: 33.2 G/DL (ref 31–36)
MCV RBC AUTO: 93.9 FL (ref 80–100)
MONOCYTES ABSOLUTE: 1 K/UL (ref 0–1.3)
MONOCYTES RELATIVE PERCENT: 9.2 %
NEUTROPHILS ABSOLUTE: 6.1 K/UL (ref 1.7–7.7)
NEUTROPHILS RELATIVE PERCENT: 53.9 %
PDW BLD-RTO: 12.6 % (ref 12.4–15.4)
PLATELET # BLD: 237 K/UL (ref 135–450)
PMV BLD AUTO: 8.7 FL (ref 5–10.5)
POTASSIUM REFLEX MAGNESIUM: 3.7 MMOL/L (ref 3.5–5.1)
RBC # BLD: 3.65 M/UL (ref 4–5.2)
SODIUM BLD-SCNC: 138 MMOL/L (ref 136–145)
TROPONIN: <0.01 NG/ML
WBC # BLD: 11.3 K/UL (ref 4–11)

## 2022-05-29 PROCEDURE — 6360000002 HC RX W HCPCS: Performed by: STUDENT IN AN ORGANIZED HEALTH CARE EDUCATION/TRAINING PROGRAM

## 2022-05-29 PROCEDURE — 93005 ELECTROCARDIOGRAM TRACING: CPT | Performed by: STUDENT IN AN ORGANIZED HEALTH CARE EDUCATION/TRAINING PROGRAM

## 2022-05-29 PROCEDURE — 81001 URINALYSIS AUTO W/SCOPE: CPT

## 2022-05-29 PROCEDURE — 96374 THER/PROPH/DIAG INJ IV PUSH: CPT

## 2022-05-29 PROCEDURE — 80048 BASIC METABOLIC PNL TOTAL CA: CPT

## 2022-05-29 PROCEDURE — 99285 EMERGENCY DEPT VISIT HI MDM: CPT

## 2022-05-29 PROCEDURE — 71046 X-RAY EXAM CHEST 2 VIEWS: CPT

## 2022-05-29 PROCEDURE — 84484 ASSAY OF TROPONIN QUANT: CPT

## 2022-05-29 PROCEDURE — 85025 COMPLETE CBC W/AUTO DIFF WBC: CPT

## 2022-05-29 PROCEDURE — 85379 FIBRIN DEGRADATION QUANT: CPT

## 2022-05-29 PROCEDURE — 36415 COLL VENOUS BLD VENIPUNCTURE: CPT

## 2022-05-29 RX ORDER — MORPHINE SULFATE 2 MG/ML
2 INJECTION, SOLUTION INTRAMUSCULAR; INTRAVENOUS ONCE
Status: COMPLETED | OUTPATIENT
Start: 2022-05-29 | End: 2022-05-29

## 2022-05-29 RX ADMIN — MORPHINE SULFATE 2 MG: 2 INJECTION, SOLUTION INTRAMUSCULAR; INTRAVENOUS at 23:07

## 2022-05-29 ASSESSMENT — ENCOUNTER SYMPTOMS
BACK PAIN: 1
GASTROINTESTINAL NEGATIVE: 1
CHEST TIGHTNESS: 1
WHEEZING: 0
EYES NEGATIVE: 1
ALLERGIC/IMMUNOLOGIC NEGATIVE: 1
SHORTNESS OF BREATH: 0

## 2022-05-30 VITALS
HEART RATE: 88 BPM | BODY MASS INDEX: 36.5 KG/M2 | TEMPERATURE: 98.1 F | SYSTOLIC BLOOD PRESSURE: 144 MMHG | OXYGEN SATURATION: 99 % | HEIGHT: 63 IN | DIASTOLIC BLOOD PRESSURE: 70 MMHG | RESPIRATION RATE: 22 BRPM | WEIGHT: 206 LBS

## 2022-05-30 LAB
AMORPHOUS: ABNORMAL /HPF
BILIRUBIN URINE: NEGATIVE
BLOOD, URINE: ABNORMAL
CLARITY: CLEAR
COLOR: YELLOW
EKG ATRIAL RATE: 94 BPM
EKG DIAGNOSIS: NORMAL
EKG P AXIS: 53 DEGREES
EKG P-R INTERVAL: 130 MS
EKG Q-T INTERVAL: 368 MS
EKG QRS DURATION: 88 MS
EKG QTC CALCULATION (BAZETT): 460 MS
EKG R AXIS: 15 DEGREES
EKG T AXIS: 59 DEGREES
EKG VENTRICULAR RATE: 94 BPM
EPITHELIAL CELLS, UA: ABNORMAL /HPF (ref 0–5)
GLUCOSE URINE: NEGATIVE MG/DL
KETONES, URINE: NEGATIVE MG/DL
LEUKOCYTE ESTERASE, URINE: NEGATIVE
MICROSCOPIC EXAMINATION: YES
NITRITE, URINE: NEGATIVE
PH UA: 6 (ref 5–8)
PROTEIN UA: NEGATIVE MG/DL
RBC UA: ABNORMAL /HPF (ref 0–4)
SPECIFIC GRAVITY UA: 1.01 (ref 1–1.03)
URINE TYPE: ABNORMAL
UROBILINOGEN, URINE: 0.2 E.U./DL
WBC UA: ABNORMAL /HPF (ref 0–5)

## 2022-05-30 PROCEDURE — 6370000000 HC RX 637 (ALT 250 FOR IP): Performed by: STUDENT IN AN ORGANIZED HEALTH CARE EDUCATION/TRAINING PROGRAM

## 2022-05-30 RX ORDER — LIDOCAINE 4 G/G
1 PATCH TOPICAL ONCE
Status: DISCONTINUED | OUTPATIENT
Start: 2022-05-30 | End: 2022-05-30 | Stop reason: HOSPADM

## 2022-05-30 RX ORDER — LIDOCAINE 4 G/G
1 PATCH TOPICAL DAILY
Qty: 10 PATCH | Refills: 0 | Status: SHIPPED | OUTPATIENT
Start: 2022-05-30 | End: 2022-06-09

## 2022-05-30 ASSESSMENT — PAIN SCALES - GENERAL: PAINLEVEL_OUTOF10: 10

## 2022-05-30 NOTE — ED PROVIDER NOTES
[facility] RESIDENT NOTE       Date of evaluation: 5/29/2022    Chief Complaint     Back Pain (extreme back pain started this morning)      History of Present Illness     Nano Thompson is a 64 y.o. female who presents with chest and back pain that started this morning. Pain is described as L sided back pain that radiates to her central chest. Pain is worse with movement and does not respond to diclofenac gel or NSAIDs. No clear association with exertion but not better with rest. No associated SOB, fever, chills, leg pain/swelling. Patient has never had chest pain in the past and tells me she has no cardiac history. The back pain she typically has occurs on the right. Review of Systems     Review of Systems   Constitutional: Negative for activity change, chills and fever. HENT: Negative. Eyes: Negative. Respiratory: Positive for chest tightness. Negative for shortness of breath and wheezing. Cardiovascular: Positive for chest pain. Negative for palpitations and leg swelling. Gastrointestinal: Negative. Endocrine: Negative. Genitourinary: Negative. Musculoskeletal: Positive for back pain and joint swelling. Skin: Negative. Allergic/Immunologic: Negative. Neurological: Negative. Hematological: Negative. Psychiatric/Behavioral: Negative. Past Medical, Surgical, Family, and Social History     She has a past medical history of Chronic pain and Hypertension. She has a past surgical history that includes Hysterectomy; Appendectomy; and Cholecystectomy. Her family history is not on file. She reports that she has quit smoking. She has never used smokeless tobacco. She reports previous drug use. Drug: Marijuana [employer]). She reports that she does not drink alcohol.     Medications     Previous Medications    ACETAMINOPHEN (TYLENOL PO)    Take by mouth    AMLODIPINE (NORVASC) 5 MG TABLET    Take 1 tablet by mouth daily seconds. Neurological:      General: No focal deficit present. Mental Status: She is alert and oriented to person, place, and time. Cranial Nerves: No cranial nerve deficit. Motor: No weakness. Psychiatric:         Mood and Affect: Mood normal.         Diagnostic Results     EKG   Interpreted inconjunction with emergency department physician Dwayne Gray MD  Rhythm: normal sinus   Rate: normal  Axis: normal  Ectopy: none  Conduction: normal  ST Segments: no acute change  T Waves: no acute change  Q Waves: none  Clinical Impression: no acute changes  Comparison: 4/2022    RADIOLOGY:  XR CHEST (2 VW)   Final Result      No acute pulmonary disease.              LABS:   Results for orders placed or performed during the hospital encounter of 05/29/22   CBC with Auto Differential   Result Value Ref Range    WBC 11.3 (H) 4.0 - 11.0 K/uL    RBC 3.65 (L) 4.00 - 5.20 M/uL    Hemoglobin 11.4 (L) 12.0 - 16.0 g/dL    Hematocrit 34.2 (L) 36.0 - 48.0 %    MCV 93.9 80.0 - 100.0 fL    MCH 31.2 26.0 - 34.0 pg    MCHC 33.2 31.0 - 36.0 g/dL    RDW 12.6 12.4 - 15.4 %    Platelets 094 136 - 447 K/uL    MPV 8.7 5.0 - 10.5 fL    Neutrophils % 53.9 %    Lymphocytes % 35.1 %    Monocytes % 9.2 %    Eosinophils % 1.3 %    Basophils % 0.5 %    Neutrophils Absolute 6.1 1.7 - 7.7 K/uL    Lymphocytes Absolute 4.0 1.0 - 5.1 K/uL    Monocytes Absolute 1.0 0.0 - 1.3 K/uL    Eosinophils Absolute 0.2 0.0 - 0.6 K/uL    Basophils Absolute 0.1 0.0 - 0.2 K/uL   Basic Metabolic Panel w/ Reflex to MG   Result Value Ref Range    Sodium 138 136 - 145 mmol/L    Potassium reflex Magnesium 3.7 3.5 - 5.1 mmol/L    Chloride 100 99 - 110 mmol/L    CO2 23 21 - 32 mmol/L    Anion Gap 15 3 - 16    Glucose 118 (H) 70 - 99 mg/dL    BUN 15 7 - 20 mg/dL    CREATININE 0.8 0.6 - 1.2 mg/dL    GFR Non-African American >60 >60    GFR African American >60 >60    Calcium 10.1 8.3 - 10.6 mg/dL   Troponin   Result Value Ref Range    Troponin <0.01 <0.01 ng/mL Urinalysis with Microscopic   Result Value Ref Range    Color, UA Yellow Straw/Yellow    Clarity, UA Clear Clear    Glucose, Ur Negative Negative mg/dL    Bilirubin Urine Negative Negative    Ketones, Urine Negative Negative mg/dL    Specific Gravity, UA 1.010 1.005 - 1.030    Blood, Urine MODERATE (A) Negative    pH, UA 6.0 5.0 - 8.0    Protein, UA Negative Negative mg/dL    Urobilinogen, Urine 0.2 <2.0 E.U./dL    Nitrite, Urine Negative Negative    Leukocyte Esterase, Urine Negative Negative    Microscopic Examination YES     Urine Type Voided     WBC, UA 0-2 0 - 5 /HPF    RBC, UA 5-10 (A) 0 - 4 /HPF    Epithelial Cells, UA 6-10 (A) 0 - 5 /HPF    Amorphous, UA 1+ /HPF   D-Dimer, Quantitative   Result Value Ref Range    D-Dimer, Quant 0.36 0.00 - 0.60 ug/mL FEU         RECENT VITALS:  BP: (!) 144/70, Temp: 98.1 °F (36.7 °C),Heart Rate: 88, Resp: 22, SpO2: 99 %     Procedures     None    ED Course     Nursing Notes, Past Medical Hx, Past Surgical Hx, Social Hx, Allergies, and FamilyHx were reviewed. The patient was giventhe following medications:  Orders Placed This Encounter   Medications    morphine (PF) injection 2 mg    lidocaine 4 % external patch 1 patch    lidocaine 4 % external patch     Sig: Place 1 patch onto the skin daily for 10 days     Dispense:  10 patch     Refill:  0       CONSULTS:  None    MEDICAL Deidre Sellers / ASSESSMENT / Tadeo Shirin is a 64 y.o. female presenting with back and chest pain. Given patient description of chest pain did feel it was warranted to rule out cardiac involvement due to acute pain and pt risk factors. Initial troponin negative and d-dimer negative as well. CXR wnl and EKG with no acute changes. UA with moderate blood which is not uncommon for her. RFP unremarkable. CBC significant for mild leukocytosis to 11.3. Having ruled out major cardiac involvement for this episode suspect her pain is likely musculoskeletal in nature.  Will send home with Lidocaine patches. Patient should follow up with PCP. We did discuss return precautions. This patient was also evaluated by the attending physician. All care plans were discussed and agreed upon. Clinical Impression     1.  Pain in thoracic spine        Disposition     PATIENT REFERRED TO:  [facility] [address]  [address]    Schedule an appointment as soon as possible for a visit   1 week      DISCHARGE MEDICATIONS:  New Prescriptions    LIDOCAINE 4 % EXTERNAL PATCH    Place 1 patch onto the skin daily for 10 days       DISPOSITION Decision To Discharge 05/30/2022 12:23:09 AM       Jaziel Pfeiffer MD  Resident  05/30/22 0030

## 2022-05-30 NOTE — ED PROVIDER NOTES
ED Attending Attestation Note     Date of evaluation: 5/29/2022    This patient was seen by the resident. I have seen and examined the patient, agree with the workup, evaluation, management and diagnosis. The care plan has been discussed. I have reviewed the ECG and concur with the resident's interpretation. My assessment reveals a 59-year-old female who presents with a chief complaint of back pain. Patient with pain in her mid upper back radiating around to her left chest.  States anytime she moves it hurts. Denies any associated shortness of breath, diaphoresis. No known heart history. Overall well-appearing with tenderness to palpation along left trapezius. Salma Sims MD  05/30/22 [id number]

## 2022-10-05 ENCOUNTER — HOSPITAL ENCOUNTER (EMERGENCY)
Age: 62
Discharge: HOME | End: 2022-10-05
Attending: EMERGENCY MEDICINE
Payer: COMMERCIAL

## 2022-10-05 VITALS
BODY MASS INDEX: 36.23 KG/M2 | HEART RATE: 105 BPM | HEIGHT: 62 IN | OXYGEN SATURATION: 99 % | TEMPERATURE: 98.9 F | RESPIRATION RATE: 18 BRPM | SYSTOLIC BLOOD PRESSURE: 154 MMHG | DIASTOLIC BLOOD PRESSURE: 102 MMHG | WEIGHT: 196.9 LBS

## 2022-10-05 DIAGNOSIS — M54.32 SCIATICA OF LEFT SIDE: Primary | ICD-10-CM

## 2022-10-05 DIAGNOSIS — G89.29 CHRONIC LOW BACK PAIN, UNSPECIFIED BACK PAIN LATERALITY, UNSPECIFIED WHETHER SCIATICA PRESENT: ICD-10-CM

## 2022-10-05 DIAGNOSIS — M54.50 CHRONIC LOW BACK PAIN, UNSPECIFIED BACK PAIN LATERALITY, UNSPECIFIED WHETHER SCIATICA PRESENT: ICD-10-CM

## 2022-10-05 PROCEDURE — 99283 EMERGENCY DEPT VISIT LOW MDM: CPT

## 2022-10-05 PROCEDURE — 6370000000 HC RX 637 (ALT 250 FOR IP): Performed by: EMERGENCY MEDICINE

## 2022-10-05 RX ORDER — LOSARTAN POTASSIUM 25 MG/1
25 TABLET ORAL DAILY
COMMUNITY
Start: 2022-09-06

## 2022-10-05 RX ORDER — PREDNISONE 20 MG/1
60 TABLET ORAL ONCE
Status: COMPLETED | OUTPATIENT
Start: 2022-10-05 | End: 2022-10-05

## 2022-10-05 RX ORDER — ATORVASTATIN CALCIUM 80 MG/1
80 TABLET, FILM COATED ORAL DAILY
COMMUNITY
Start: 2022-09-06

## 2022-10-05 RX ORDER — PREDNISONE 20 MG/1
20 TABLET ORAL 2 TIMES DAILY
Qty: 10 TABLET | Refills: 0 | Status: SHIPPED | OUTPATIENT
Start: 2022-10-05 | End: 2022-10-10

## 2022-10-05 RX ORDER — HYDROCODONE BITARTRATE AND ACETAMINOPHEN 5; 325 MG/1; MG/1
1 TABLET ORAL EVERY 6 HOURS PRN
Qty: 12 TABLET | Refills: 0 | Status: SHIPPED | OUTPATIENT
Start: 2022-10-05 | End: 2022-10-08

## 2022-10-05 RX ORDER — HYDROCODONE BITARTRATE AND ACETAMINOPHEN 5; 325 MG/1; MG/1
1 TABLET ORAL ONCE
Status: COMPLETED | OUTPATIENT
Start: 2022-10-05 | End: 2022-10-05

## 2022-10-05 RX ADMIN — PREDNISONE 60 MG: 20 TABLET ORAL at 15:05

## 2022-10-05 RX ADMIN — HYDROCODONE BITARTRATE AND ACETAMINOPHEN 1 TABLET: 5; 325 TABLET ORAL at 15:05

## 2022-10-05 ASSESSMENT — PAIN - FUNCTIONAL ASSESSMENT
PAIN_FUNCTIONAL_ASSESSMENT: NONE - DENIES PAIN
PAIN_FUNCTIONAL_ASSESSMENT: 0-10

## 2022-10-05 ASSESSMENT — PAIN SCALES - GENERAL: PAINLEVEL_OUTOF10: 10

## 2022-10-05 ASSESSMENT — PAIN DESCRIPTION - FREQUENCY: FREQUENCY: CONTINUOUS

## 2022-10-05 ASSESSMENT — PAIN DESCRIPTION - ORIENTATION: ORIENTATION: LEFT

## 2022-10-05 ASSESSMENT — PAIN DESCRIPTION - DESCRIPTORS: DESCRIPTORS: ACHING

## 2022-10-05 ASSESSMENT — PAIN DESCRIPTION - PAIN TYPE: TYPE: ACUTE PAIN

## 2022-10-05 ASSESSMENT — PAIN DESCRIPTION - LOCATION: LOCATION: BACK

## 2022-10-05 NOTE — DISCHARGE INSTRUCTIONS
Try using ice on the painful area on your left buttock for the next 3 to 5 days if needed for pain. Take the prednisone 1 tablet twice daily with food until gone. You can start this medication tomorrow morning with breakfast.  Take your muscle relaxant as directed if needed for muscle spasm or tightness. Use the Norco as directed if needed for severe pain. Follow-up with your primary care doctor as needed. Follow-up with your pain management doctor as scheduled.

## 2022-10-05 NOTE — ED TRIAGE NOTES
Patient to ed with complaints of left lower back and buttock pain which started last evening when she got out of her chair, patient denies injury.

## 2022-10-05 NOTE — ED NOTES
Patient given prescription, discharge instructions verbal and written, patient verbalized understanding. Alert/oriented X4, Clear speech.   Patient exhibits no distress, ambulates with steady gait per self leaving unit, no further request.      Kim Bergman RN  10/05/22 1765

## 2022-10-06 NOTE — ED PROVIDER NOTES
TRIAGE CHIEF COMPLAINT:   Chief Complaint   Patient presents with    Back Pain     Left buttock          HPI: Doug Richardson is a 64 y.o. female who presents to the Emergency Department with complaint of onset a few days ago of pain in the left upper buttock and left lower back. The pain radiates down into her thigh. She has had similar pain in the past thought to be due to. No known injury. She has no bowel or bladder complaint. Denies numbness or weakness. No fever or chills. She has no abdominal pain. Denies UTI symptoms. The patient has taken Tylenol but no other medication. She does have a history of chronic lower back pain. She is scheduled to see a new pain doctor next week. REVIEW OF SYSTEMS:  6 systems reviewed. Pertinent positives per HPI. Otherwise noted to be negative. Nursing notes reviewed and agree with above. Past medical/surgical history reviewed. MEDICATIONS   Discharge Medication List as of 10/5/2022  3:02 PM        START taking these medications    Details   HYDROcodone-acetaminophen (NORCO) 5-325 MG per tablet Take 1 tablet by mouth every 6 hours as needed for Pain for up to 3 days.  Sedation precautions please, Disp-12 tablet, R-0Normal      predniSONE (DELTASONE) 20 MG tablet Take 1 tablet by mouth 2 times daily for 5 days With food, Disp-10 tablet, R-0Normal           CONTINUE these medications which have NOT CHANGED    Details   losartan (COZAAR) 25 MG tablet Take 25 mg by mouth dailyHistorical Med      atorvastatin (LIPITOR) 80 MG tablet Take 80 mg by mouth dailyHistorical Med      pantoprazole (PROTONIX) 40 MG tablet Take 1 tablet by mouth every morning (before breakfast), Disp-30 tablet, R-3Print      ondansetron (ZOFRAN-ODT) 4 MG disintegrating tablet Take 1 tablet by mouth every 8 hours as needed for Nausea or Vomiting, Disp-20 tablet, R-0Print      ROSUVASTATIN CALCIUM PO Take by mouthHistorical Med      CHLORTHALIDONE PO Take by mouthHistorical Med Acetaminophen (TYLENOL PO) Take by mouthHistorical Med      docusate sodium (COLACE) 100 MG capsule Take 100 mg by mouth 2 times dailyHistorical Med      ibuprofen (IBU) 600 MG tablet Take 1 tablet by mouth every 8 hours as needed for Pain, Disp-20 tablet, R-0Print      Multiple Vitamins-Minerals (THERAPEUTIC MULTIVITAMIN-MINERALS) tablet Take 1 tablet by mouth dailyHistorical Med           STOP taking these medications       amLODIPine (NORVASC) 5 MG tablet Comments:   Reason for Stopping:         hydroCHLOROthiazide (HYDRODIURIL) 25 MG tablet Comments:   Reason for Stopping:         SPIRONOLACTONE PO Comments:   Reason for Stopping:         hydrochlorothiazide (HYDRODIURIL) 25 MG tablet Comments:   Reason for Stopping:                 ALLERGIES   Allergies   Allergen Reactions    Aspirin      Pt does not know     Pcn [Penicillins]          BP (!) 154/102   Pulse (!) 105   Temp 98.9 °F (37.2 °C) (Oral)   Resp 18   Ht 5' 2\" (1.575 m)   Wt 196 lb 14.4 oz (89.3 kg)   SpO2 99%   BMI 36.01 kg/m²   General:  No acute distress. Non toxic appearance  Head:   Normocephalic and atraumatic  Eyes:   Conjunctiva clear, MCKAYLA, EOM's intact. Sclera anicteric. ENT:   Mucous membranes moist  Neck:   Supple. No adenopathy. Lungs/Chest:  No respiratory distress  CVS:   Regular rate and rhythm  Abdomen: Bowel sounds normal.  Soft and nontender  Extremities:  Full range of motion  Skin:   No rashes or lesions to exposed skin  Back:   She has some tenderness of the left upper in the region of the sciatic notch. Mild left paralumbar tenderness noted. Back range of motion is moderately decreased in all directions. Straight leg raises are negative to 80 degrees bilaterally. There is no foot drop. She has normal plantar flexion. Grossly normal muscle strength bilaterally in the quadriceps, hamstrings, gastrocnemius and EHL. Reflexes are 2+ and symmetrical.  She has normal sensation to light touch bilaterally. Neuro:  Alert and OX3. Speech clear and appropriate. No upper/lower extremity weakness. Normal sensation in all extremities. No facial asymmetry or weakness. Gait normal.  Psych:   Affect normal. Mood normal        RADIOLOGY:      LAB      ED COURSE / MDM:  60-year-old female with history of chronic lower back pain and previous sciatica presents with left-sided sciatica symptoms that started a few days ago. No bowel or bladder complaint. No fever or chills. She has neurologically intact with no clinical evidence for cauda equina syndrome or spinal epidural abscess. She was given prescriptions for short course of prednisone, meloxicam and a few Norco tablets to take for more severe pain. Advise follow-up with primary care and her new pain management physician. Recommended ice to the area. I discussed with Maximo Martínez the results of the evaluation in the Emergency Department, diagnosis, care, prognosis and the importance of follow-up. The patient is stable for discharge. The patient and/or family are in agreement with the plan and all questions have been answered. Specific discharge instructions were explained, including reasons to return to the emergency department.       (Please note that portions of this note may have been completed with a voice recognition program.  Efforts were made to edit the dictation but occasionally words are mis-transcribed)        FINAL IMPRESSION:  1 --sciatica of left side  2 --chronic low back pain                 Asiya Smith MD  10/06/22 7938

## 2024-03-18 ENCOUNTER — HOSPITAL ENCOUNTER (EMERGENCY)
Age: 64
Discharge: HOME | End: 2024-03-18
Attending: EMERGENCY MEDICINE
Payer: COMMERCIAL

## 2024-03-18 ENCOUNTER — APPOINTMENT (OUTPATIENT)
Dept: GENERAL RADIOLOGY | Age: 64
End: 2024-03-18
Payer: COMMERCIAL

## 2024-03-18 VITALS
SYSTOLIC BLOOD PRESSURE: 170 MMHG | HEART RATE: 61 BPM | RESPIRATION RATE: 21 BRPM | TEMPERATURE: 97.7 F | OXYGEN SATURATION: 97 % | WEIGHT: 190.2 LBS | BODY MASS INDEX: 33.7 KG/M2 | DIASTOLIC BLOOD PRESSURE: 89 MMHG | HEIGHT: 63 IN

## 2024-03-18 DIAGNOSIS — R07.9 CHEST PAIN, UNSPECIFIED TYPE: Primary | ICD-10-CM

## 2024-03-18 LAB
ANION GAP SERPL CALCULATED.3IONS-SCNC: 9 MMOL/L (ref 3–16)
BASOPHILS # BLD: 0 K/UL (ref 0–0.2)
BASOPHILS NFR BLD: 0.5 %
BUN SERPL-MCNC: 11 MG/DL (ref 7–20)
CALCIUM SERPL-MCNC: 9.5 MG/DL (ref 8.3–10.6)
CHLORIDE SERPL-SCNC: 108 MMOL/L (ref 99–110)
CO2 SERPL-SCNC: 28 MMOL/L (ref 21–32)
CREAT SERPL-MCNC: 0.7 MG/DL (ref 0.6–1.2)
DEPRECATED RDW RBC AUTO: 13.6 % (ref 12.4–15.4)
EKG ATRIAL RATE: 66 BPM
EKG DIAGNOSIS: NORMAL
EKG P AXIS: 50 DEGREES
EKG P-R INTERVAL: 150 MS
EKG Q-T INTERVAL: 398 MS
EKG QRS DURATION: 72 MS
EKG QTC CALCULATION (BAZETT): 417 MS
EKG R AXIS: 2 DEGREES
EKG T AXIS: 23 DEGREES
EKG VENTRICULAR RATE: 66 BPM
EOSINOPHIL # BLD: 0.1 K/UL (ref 0–0.6)
EOSINOPHIL NFR BLD: 1.4 %
GFR SERPLBLD CREATININE-BSD FMLA CKD-EPI: >60 ML/MIN/{1.73_M2}
GLUCOSE SERPL-MCNC: 107 MG/DL (ref 70–99)
HCT VFR BLD AUTO: 37.2 % (ref 36–48)
HGB BLD-MCNC: 12.4 G/DL (ref 12–16)
LYMPHOCYTES # BLD: 3.2 K/UL (ref 1–5.1)
LYMPHOCYTES NFR BLD: 37.6 %
MCH RBC QN AUTO: 32.5 PG (ref 26–34)
MCHC RBC AUTO-ENTMCNC: 33.4 G/DL (ref 31–36)
MCV RBC AUTO: 97.4 FL (ref 80–100)
MONOCYTES # BLD: 0.6 K/UL (ref 0–1.3)
MONOCYTES NFR BLD: 6.7 %
NEUTROPHILS # BLD: 4.5 K/UL (ref 1.7–7.7)
NEUTROPHILS NFR BLD: 53.8 %
PLATELET # BLD AUTO: 196 K/UL (ref 135–450)
PMV BLD AUTO: 8.2 FL (ref 5–10.5)
POTASSIUM SERPL-SCNC: 4 MMOL/L (ref 3.5–5.1)
RBC # BLD AUTO: 3.82 M/UL (ref 4–5.2)
SODIUM SERPL-SCNC: 145 MMOL/L (ref 136–145)
TROPONIN, HIGH SENSITIVITY: 11 NG/L (ref 0–14)
TROPONIN, HIGH SENSITIVITY: 7 NG/L (ref 0–14)
WBC # BLD AUTO: 8.4 K/UL (ref 4–11)

## 2024-03-18 PROCEDURE — 85025 COMPLETE CBC W/AUTO DIFF WBC: CPT

## 2024-03-18 PROCEDURE — 2500000003 HC RX 250 WO HCPCS: Performed by: EMERGENCY MEDICINE

## 2024-03-18 PROCEDURE — 6370000000 HC RX 637 (ALT 250 FOR IP): Performed by: EMERGENCY MEDICINE

## 2024-03-18 PROCEDURE — 99285 EMERGENCY DEPT VISIT HI MDM: CPT

## 2024-03-18 PROCEDURE — 84484 ASSAY OF TROPONIN QUANT: CPT

## 2024-03-18 PROCEDURE — 93005 ELECTROCARDIOGRAM TRACING: CPT | Performed by: EMERGENCY MEDICINE

## 2024-03-18 PROCEDURE — 71046 X-RAY EXAM CHEST 2 VIEWS: CPT

## 2024-03-18 PROCEDURE — 80048 BASIC METABOLIC PNL TOTAL CA: CPT

## 2024-03-18 RX ORDER — CYCLOBENZAPRINE HCL 10 MG
10 TABLET ORAL 3 TIMES DAILY PRN
Qty: 21 TABLET | Refills: 0 | Status: SHIPPED | OUTPATIENT
Start: 2024-03-18 | End: 2024-03-28

## 2024-03-18 RX ORDER — PANTOPRAZOLE SODIUM 40 MG/1
40 TABLET, DELAYED RELEASE ORAL
Qty: 30 TABLET | Refills: 12 | Status: SHIPPED | OUTPATIENT
Start: 2024-03-18

## 2024-03-18 RX ADMIN — ALUMINUM HYDROXIDE, MAGNESIUM HYDROXIDE, AND SIMETHICONE: 1200; 120; 1200 SUSPENSION ORAL at 12:30

## 2024-03-18 ASSESSMENT — PAIN DESCRIPTION - PAIN TYPE: TYPE: ACUTE PAIN

## 2024-03-18 ASSESSMENT — PAIN DESCRIPTION - DESCRIPTORS: DESCRIPTORS: DISCOMFORT

## 2024-03-18 ASSESSMENT — LIFESTYLE VARIABLES
HOW MANY STANDARD DRINKS CONTAINING ALCOHOL DO YOU HAVE ON A TYPICAL DAY: 1 OR 2
HOW OFTEN DO YOU HAVE A DRINK CONTAINING ALCOHOL: 2-3 TIMES A WEEK

## 2024-03-18 ASSESSMENT — PAIN SCALES - GENERAL: PAINLEVEL_OUTOF10: 9

## 2024-03-18 ASSESSMENT — PAIN DESCRIPTION - LOCATION: LOCATION: CHEST

## 2024-03-18 ASSESSMENT — PAIN DESCRIPTION - FREQUENCY: FREQUENCY: CONTINUOUS

## 2024-03-18 ASSESSMENT — PAIN DESCRIPTION - ONSET: ONSET: ON-GOING

## 2024-03-18 ASSESSMENT — PAIN - FUNCTIONAL ASSESSMENT
PAIN_FUNCTIONAL_ASSESSMENT: 0-10
PAIN_FUNCTIONAL_ASSESSMENT: ACTIVITIES ARE NOT PREVENTED

## 2024-03-18 NOTE — ED PROVIDER NOTES
similar morphology.  Final interpretation is sinus rhythm with no acute signs of STEMI.    ED BEDSIDE ULTRASOUND:  No results found.    MOST RECENT VITALS:  BP: (!) 170/89,Temp: 97.7 °F (36.5 °C), Pulse: 61, Respirations: 21, SpO2: 97 %     Procedures     ***    ED Course     Nursing Notes, Past Medical Hx, Past Surgical Hx, Social Hx,Allergies, and Family Hx were reviewed.         The patient was given the following medications:  Orders Placed This Encounter   Medications    aluminum & magnesium hydroxide-simethicone (MAALOX) 30 mL, lidocaine viscous hcl (XYLOCAINE) 5 mL (GI COCKTAIL)    pantoprazole (PROTONIX) 40 MG tablet     Sig: Take 1 tablet by mouth every morning (before breakfast)     Dispense:  30 tablet     Refill:  12    cyclobenzaprine (FLEXERIL) 10 MG tablet     Sig: Take 1 tablet by mouth 3 times daily as needed for Muscle spasms     Dispense:  21 tablet     Refill:  0       CONSULTS:  None    Review of Systems     Review of Systems    Please see HPI for pertinent positive and negatives. A complete review of systems was conducted and is otherwise negative unless noted.    Past Medical, Surgical, Family, and Social History     She has a past medical history of Chronic pain, Hyperlipidemia, and Hypertension.  She has a past surgical history that includes Hysterectomy; Appendectomy; and Cholecystectomy.  Her family history is not on file.  She reports that she has quit smoking. She has never used smokeless tobacco. She reports that she does not currently use drugs after having used the following drugs: Marijuana (Weed). She reports that she does not drink alcohol.    Medications     Discharge Medication List as of 3/18/2024  2:20 PM        CONTINUE these medications which have NOT CHANGED    Details   losartan (COZAAR) 25 MG tablet Take 25 mg by mouth dailyHistorical Med      atorvastatin (LIPITOR) 80 MG tablet Take 80 mg by mouth dailyHistorical Med      ondansetron (ZOFRAN-ODT) 4 MG disintegrating  distress, able toconverse in full sentences  HEENT:  Normocephalic, atraumatic, PERRL, extra-ocular movements intact, oropharynx benign  Neck:  Supple, no lymphadenopathy, trachea midline, no masses  Pulmonary:   Clear to auscultation bilaterally, good air movement, no wheezes  Cardiac:  Regular rate and rhythm, no M/R/G  Abdomen:  Soft, non-tender, non-distended, no rebounding or guarding  Musculoskeletal:  2+ pulses, no edema or clubbing  Skin:  No concerning rashes or lesions, no cyanosis  Neuro: Alert and oriented X 4,able to move all four extremities with equal strength bilaterally, sensory exam grossly intact, cranial nerves II-XII intact  Psych:  Appropriate mood and affect                 Juan Alberto Heredia MD  03/25/24 5529

## 2024-03-18 NOTE — DISCHARGE INSTRUCTIONS
Your EKG and troponin did not show any sign of heart damage today. Your chest x ray was normal.    Follow up with your physician or clinic within the next 2-3 days. Return to the ED immediately if you have worse or continued chest pain, shortness of breath, nausea, sweating during chest pain, trouble breathing, chest pain with exertion (climbing stairs or walking for example), or any other concerns.

## 2025-05-31 ENCOUNTER — APPOINTMENT (OUTPATIENT)
Dept: CT IMAGING | Age: 65
End: 2025-05-31
Payer: COMMERCIAL

## 2025-05-31 ENCOUNTER — HOSPITAL ENCOUNTER (EMERGENCY)
Age: 65
Discharge: HOME | End: 2025-05-31
Attending: STUDENT IN AN ORGANIZED HEALTH CARE EDUCATION/TRAINING PROGRAM
Payer: COMMERCIAL

## 2025-05-31 VITALS
HEIGHT: 63 IN | SYSTOLIC BLOOD PRESSURE: 123 MMHG | TEMPERATURE: 98.2 F | HEART RATE: 97 BPM | OXYGEN SATURATION: 95 % | DIASTOLIC BLOOD PRESSURE: 75 MMHG | RESPIRATION RATE: 18 BRPM | WEIGHT: 195.9 LBS | BODY MASS INDEX: 34.71 KG/M2

## 2025-05-31 DIAGNOSIS — R10.9 FLANK PAIN: Primary | ICD-10-CM

## 2025-05-31 LAB
ALBUMIN SERPL-MCNC: 3.9 G/DL (ref 3.4–5)
ALBUMIN SERPL-MCNC: 4 G/DL (ref 3.4–5)
ALBUMIN/GLOB SERPL: 1.3 {RATIO} (ref 1.1–2.2)
ALBUMIN/GLOB SERPL: ABNORMAL {RATIO} (ref 1.1–2.2)
ALP SERPL-CCNC: 106 U/L (ref 40–129)
ALP SERPL-CCNC: 98 U/L (ref 40–129)
ALT SERPL-CCNC: 20 U/L (ref 10–40)
ALT SERPL-CCNC: ABNORMAL U/L (ref 10–40)
ANION GAP SERPL CALCULATED.3IONS-SCNC: 11 MMOL/L (ref 3–16)
ANION GAP SERPL CALCULATED.3IONS-SCNC: ABNORMAL MMOL/L (ref 3–16)
AST SERPL-CCNC: 22 U/L (ref 15–37)
AST SERPL-CCNC: 70 U/L (ref 15–37)
BACTERIA URNS QL MICRO: ABNORMAL /HPF
BASOPHILS # BLD: 0.1 K/UL (ref 0–0.2)
BASOPHILS NFR BLD: 0.9 %
BILIRUB SERPL-MCNC: 0.4 MG/DL (ref 0–1)
BILIRUB SERPL-MCNC: 0.4 MG/DL (ref 0–1)
BILIRUB UR QL STRIP.AUTO: NEGATIVE
BUN SERPL-MCNC: 18 MG/DL (ref 7–20)
BUN SERPL-MCNC: 19 MG/DL (ref 7–20)
CALCIUM SERPL-MCNC: 10 MG/DL (ref 8.3–10.6)
CALCIUM SERPL-MCNC: 10.1 MG/DL (ref 8.3–10.6)
CHLORIDE SERPL-SCNC: 101 MMOL/L (ref 99–110)
CHLORIDE SERPL-SCNC: 103 MMOL/L (ref 99–110)
CLARITY UR: CLEAR
CO2 SERPL-SCNC: 25 MMOL/L (ref 21–32)
CO2 SERPL-SCNC: ABNORMAL MMOL/L (ref 21–32)
COLOR UR: YELLOW
CREAT SERPL-MCNC: 0.9 MG/DL (ref 0.6–1.2)
CREAT SERPL-MCNC: 1 MG/DL (ref 0.6–1.2)
DEPRECATED RDW RBC AUTO: 12.7 % (ref 12.4–15.4)
EOSINOPHIL # BLD: 0.1 K/UL (ref 0–0.6)
EOSINOPHIL NFR BLD: 1.8 %
EPI CELLS #/AREA URNS HPF: ABNORMAL /HPF (ref 0–5)
GFR SERPLBLD CREATININE-BSD FMLA CKD-EPI: 63 ML/MIN/{1.73_M2}
GFR SERPLBLD CREATININE-BSD FMLA CKD-EPI: 71 ML/MIN/{1.73_M2}
GLUCOSE SERPL-MCNC: 105 MG/DL (ref 70–99)
GLUCOSE SERPL-MCNC: 91 MG/DL (ref 70–99)
GLUCOSE UR STRIP.AUTO-MCNC: NEGATIVE MG/DL
HCT VFR BLD AUTO: 37.1 % (ref 36–48)
HGB BLD-MCNC: 12.8 G/DL (ref 12–16)
HGB UR QL STRIP.AUTO: ABNORMAL
KETONES UR STRIP.AUTO-MCNC: NEGATIVE MG/DL
LEUKOCYTE ESTERASE UR QL STRIP.AUTO: NEGATIVE
LYMPHOCYTES # BLD: 2.7 K/UL (ref 1–5.1)
LYMPHOCYTES NFR BLD: 34.7 %
MCH RBC QN AUTO: 32.1 PG (ref 26–34)
MCHC RBC AUTO-ENTMCNC: 34.6 G/DL (ref 31–36)
MCV RBC AUTO: 92.8 FL (ref 80–100)
MONOCYTES # BLD: 0.7 K/UL (ref 0–1.3)
MONOCYTES NFR BLD: 9.3 %
NEUTROPHILS # BLD: 4.1 K/UL (ref 1.7–7.7)
NEUTROPHILS NFR BLD: 53.3 %
NITRITE UR QL STRIP.AUTO: NEGATIVE
PH UR STRIP.AUTO: 6 [PH] (ref 5–8)
PLATELET # BLD AUTO: 230 K/UL (ref 135–450)
PMV BLD AUTO: 8.5 FL (ref 5–10.5)
POTASSIUM SERPL-SCNC: 3.9 MMOL/L (ref 3.5–5.1)
POTASSIUM SERPL-SCNC: ABNORMAL MMOL/L (ref 3.5–5.1)
PROT SERPL-MCNC: 7.2 G/DL (ref 6.4–8.2)
PROT SERPL-MCNC: ABNORMAL G/DL (ref 6.4–8.2)
PROT UR STRIP.AUTO-MCNC: NEGATIVE MG/DL
RBC # BLD AUTO: 4 M/UL (ref 4–5.2)
RBC #/AREA URNS HPF: ABNORMAL /HPF (ref 0–4)
SODIUM SERPL-SCNC: 135 MMOL/L (ref 136–145)
SODIUM SERPL-SCNC: 139 MMOL/L (ref 136–145)
SP GR UR STRIP.AUTO: 1.02 (ref 1–1.03)
UA COMPLETE W REFLEX CULTURE PNL UR: ABNORMAL
UA DIPSTICK W REFLEX MICRO PNL UR: YES
URN SPEC COLLECT METH UR: ABNORMAL
UROBILINOGEN UR STRIP-ACNC: 0.2 E.U./DL
WBC # BLD AUTO: 7.8 K/UL (ref 4–11)
WBC #/AREA URNS HPF: ABNORMAL /HPF (ref 0–5)

## 2025-05-31 PROCEDURE — 85025 COMPLETE CBC W/AUTO DIFF WBC: CPT

## 2025-05-31 PROCEDURE — 6360000004 HC RX CONTRAST MEDICATION: Performed by: STUDENT IN AN ORGANIZED HEALTH CARE EDUCATION/TRAINING PROGRAM

## 2025-05-31 PROCEDURE — 99285 EMERGENCY DEPT VISIT HI MDM: CPT

## 2025-05-31 PROCEDURE — 96374 THER/PROPH/DIAG INJ IV PUSH: CPT

## 2025-05-31 PROCEDURE — 81001 URINALYSIS AUTO W/SCOPE: CPT

## 2025-05-31 PROCEDURE — 6360000002 HC RX W HCPCS: Performed by: STUDENT IN AN ORGANIZED HEALTH CARE EDUCATION/TRAINING PROGRAM

## 2025-05-31 PROCEDURE — 80053 COMPREHEN METABOLIC PANEL: CPT

## 2025-05-31 PROCEDURE — 87086 URINE CULTURE/COLONY COUNT: CPT

## 2025-05-31 PROCEDURE — 74177 CT ABD & PELVIS W/CONTRAST: CPT

## 2025-05-31 RX ORDER — SODIUM CHLORIDE, SODIUM LACTATE, POTASSIUM CHLORIDE, AND CALCIUM CHLORIDE .6; .31; .03; .02 G/100ML; G/100ML; G/100ML; G/100ML
1000 INJECTION, SOLUTION INTRAVENOUS ONCE
Status: DISCONTINUED | OUTPATIENT
Start: 2025-05-31 | End: 2025-05-31 | Stop reason: HOSPADM

## 2025-05-31 RX ORDER — MORPHINE SULFATE 4 MG/ML
4 INJECTION, SOLUTION INTRAMUSCULAR; INTRAVENOUS ONCE
Status: COMPLETED | OUTPATIENT
Start: 2025-05-31 | End: 2025-05-31

## 2025-05-31 RX ORDER — OXYCODONE HYDROCHLORIDE 5 MG/1
5 TABLET ORAL EVERY 6 HOURS PRN
Qty: 5 TABLET | Refills: 0 | Status: SHIPPED | OUTPATIENT
Start: 2025-05-31 | End: 2025-06-03

## 2025-05-31 RX ORDER — IOPAMIDOL 755 MG/ML
75 INJECTION, SOLUTION INTRAVASCULAR
Status: COMPLETED | OUTPATIENT
Start: 2025-05-31 | End: 2025-05-31

## 2025-05-31 RX ADMIN — MORPHINE SULFATE 4 MG: 4 INJECTION INTRAVENOUS at 13:01

## 2025-05-31 RX ADMIN — IOPAMIDOL 75 ML: 755 INJECTION, SOLUTION INTRAVENOUS at 13:35

## 2025-05-31 ASSESSMENT — PAIN DESCRIPTION - LOCATION: LOCATION: BACK

## 2025-05-31 ASSESSMENT — PAIN DESCRIPTION - ORIENTATION: ORIENTATION: LOWER

## 2025-05-31 ASSESSMENT — PAIN DESCRIPTION - DESCRIPTORS: DESCRIPTORS: ACHING

## 2025-05-31 ASSESSMENT — PAIN SCALES - GENERAL: PAINLEVEL_OUTOF10: 8

## 2025-05-31 NOTE — ED PROVIDER NOTES
THE [facility]  EMERGENCY DEPARTMENT ENCOUNTER          ATTENDING PHYSICIAN NOTE       Date of evaluation: 5/31/2025    Chief Complaint     Flank Pain (L sided flank pain since yesterday after a urological procedure. Endorses pain w/ urination yesterday, but not today. )      History of Present Illness     Miracle Escoto is a 64 y.o. female who presents with chief complaint left-sided flank pain.  Patient reports symptoms began last night, described as a intense cramp similar to labor pains in her left flank.  Patient reports dysuria yesterday which is since resolved.  Patient reports that she underwent cystoscopy for asymptomatic hematuria yesterday morning, and per her report was normal.  I do not have access to documentation from this visit.  Patient denies fevers, chills, nausea, vomiting, constipation or diarrhea.  Patient reports that her urine remains an orange color.  Patient reports that she was given 1 Percocet, and after that wore off her pain became severe      ASSESSMENT / PLAN  (MEDICAL DECISION MAKING)     INITIAL VITALS: BP: (!) 146/97, Temp: 98.2 °F (36.8 °C), Pulse: 97, Respirations: 20, SpO2: 98 %      Miracle Escoto is a 64 y.o. female presenting with chief complaint of left flank pain and suprapubic pain after recent cystoscopy.  On examination she is nontoxic-appearing with stable vital signs.  Abdominal examination notable for very mild CVA tenderness on the left, and mild suprapubic tenderness.  No signs of peritonitis.  No leukocytosis.  CMP with normal creatinine.  Urinalysis with 1+ bacteria, no pyuria, overall low concern for infection.  Will send for culture.  Patient was given 4 mg of morphine with complete resolution of her pain.  CT abdomen pelvis without acute findings.  Overall, given reassuring response to analgesia, reassuring laboratory and imaging workup, feel she is appropriate for discharge with close outpatient follow-up.  Patient was given a short course

## 2025-05-31 NOTE — DISCHARGE INSTRUCTIONS
We saw you in the hospital for left sided flank pain after a cystoscopy. Your labs and CT imaging today were reassuring. Take the oxycodone as needed for pain. We expect your symptoms to improve over the next few days. If your pain does not improve, follow up with your primary care provider in 1 week.    You should return to the emergency department if your symptoms worsen or do not resolve. In addition, return if:  - You have a fever (greater than 101 degrees)  - You have chest pain, shortness of breath, abdominal pain, or uncontrollable vomiting  - You are unable to eat or drink  - You pass out  - You have difficulty moving your arms or legs   - You have difficulty speaking or slurred speech  - Or you have any concern that you feel needs immediate physician evaluation.

## 2025-05-31 NOTE — ED NOTES
Pt encouraged to provide urine sample. Call light in place and pt instructed to call out when she can provide urine sample.      Akiko Mcarthur RN  05/31/25 [id number]

## 2025-06-01 LAB — BACTERIA UR CULT: NORMAL
